# Patient Record
Sex: MALE | Race: WHITE | Employment: OTHER | URBAN - METROPOLITAN AREA
[De-identification: names, ages, dates, MRNs, and addresses within clinical notes are randomized per-mention and may not be internally consistent; named-entity substitution may affect disease eponyms.]

---

## 2018-06-13 ENCOUNTER — OFFICE VISIT (OUTPATIENT)
Dept: PRIMARY CARE CLINIC | Age: 73
End: 2018-06-13
Payer: MEDICARE

## 2018-06-13 ENCOUNTER — HOSPITAL ENCOUNTER (OUTPATIENT)
Age: 73
Discharge: HOME | End: 2018-06-15
Payer: MEDICARE

## 2018-06-13 VITALS
HEART RATE: 59 BPM | SYSTOLIC BLOOD PRESSURE: 122 MMHG | TEMPERATURE: 97.3 F | BODY MASS INDEX: 24.5 KG/M2 | WEIGHT: 175 LBS | OXYGEN SATURATION: 98 % | HEIGHT: 71 IN | DIASTOLIC BLOOD PRESSURE: 80 MMHG

## 2018-06-13 DIAGNOSIS — I25.10 CORONARY ARTERY DISEASE INVOLVING NATIVE HEART WITHOUT ANGINA PECTORIS, UNSPECIFIED VESSEL OR LESION TYPE: ICD-10-CM

## 2018-06-13 DIAGNOSIS — Z13.6 SCREENING FOR AAA (ABDOMINAL AORTIC ANEURYSM): ICD-10-CM

## 2018-06-13 DIAGNOSIS — Z12.5 SCREENING PSA (PROSTATE SPECIFIC ANTIGEN): ICD-10-CM

## 2018-06-13 DIAGNOSIS — Z00.00 ROUTINE GENERAL MEDICAL EXAMINATION AT A HEALTH CARE FACILITY: Primary | ICD-10-CM

## 2018-06-13 LAB
ALBUMIN SERPL-MCNC: 4.5 G/DL (ref 3.5–5.2)
ALP BLD-CCNC: 56 U/L (ref 40–129)
ALT SERPL-CCNC: 16 U/L (ref 0–40)
ANION GAP SERPL CALCULATED.3IONS-SCNC: 12 MMOL/L (ref 7–16)
AST SERPL-CCNC: 16 U/L (ref 0–39)
BILIRUB SERPL-MCNC: 0.5 MG/DL (ref 0–1.2)
BUN BLDV-MCNC: 10 MG/DL (ref 8–23)
CALCIUM SERPL-MCNC: 9.5 MG/DL (ref 8.6–10.2)
CHLORIDE BLD-SCNC: 105 MMOL/L (ref 98–107)
CHOLESTEROL, TOTAL: 156 MG/DL (ref 0–199)
CO2: 27 MMOL/L (ref 22–29)
CREAT SERPL-MCNC: 0.9 MG/DL (ref 0.7–1.2)
GFR AFRICAN AMERICAN: >60
GFR NON-AFRICAN AMERICAN: >60 ML/MIN/1.73
GLUCOSE BLD-MCNC: 93 MG/DL (ref 74–109)
HDLC SERPL-MCNC: 59 MG/DL
LDL CHOLESTEROL CALCULATED: 83 MG/DL (ref 0–99)
POTASSIUM SERPL-SCNC: 4.9 MMOL/L (ref 3.5–5)
PROSTATE SPECIFIC ANTIGEN: 1.9 NG/ML (ref 0–4)
SODIUM BLD-SCNC: 144 MMOL/L (ref 132–146)
TOTAL PROTEIN: 7.4 G/DL (ref 6.4–8.3)
TRIGL SERPL-MCNC: 68 MG/DL (ref 0–149)
VLDLC SERPL CALC-MCNC: 14 MG/DL

## 2018-06-13 PROCEDURE — G0439 PPPS, SUBSEQ VISIT: HCPCS | Performed by: FAMILY MEDICINE

## 2018-06-13 PROCEDURE — G0103 PSA SCREENING: HCPCS

## 2018-06-13 PROCEDURE — 80053 COMPREHEN METABOLIC PANEL: CPT

## 2018-06-13 PROCEDURE — 80061 LIPID PANEL: CPT

## 2018-06-13 ASSESSMENT — LIFESTYLE VARIABLES: HOW OFTEN DO YOU HAVE A DRINK CONTAINING ALCOHOL: 0

## 2018-06-13 ASSESSMENT — ENCOUNTER SYMPTOMS
SHORTNESS OF BREATH: 0
ABDOMINAL PAIN: 0
ANAL BLEEDING: 1
CONSTIPATION: 0
DIARRHEA: 0

## 2018-06-13 ASSESSMENT — PATIENT HEALTH QUESTIONNAIRE - PHQ9: SUM OF ALL RESPONSES TO PHQ QUESTIONS 1-9: 0

## 2018-06-13 ASSESSMENT — ANXIETY QUESTIONNAIRES: GAD7 TOTAL SCORE: 0

## 2018-06-15 ENCOUNTER — TELEPHONE (OUTPATIENT)
Dept: PRIMARY CARE CLINIC | Age: 73
End: 2018-06-15

## 2018-06-19 ENCOUNTER — OFFICE VISIT (OUTPATIENT)
Dept: CARDIOLOGY CLINIC | Age: 73
End: 2018-06-19
Payer: MEDICARE

## 2018-06-19 ENCOUNTER — HOSPITAL ENCOUNTER (OUTPATIENT)
Dept: ULTRASOUND IMAGING | Age: 73
Discharge: HOME | End: 2018-06-21
Payer: MEDICARE

## 2018-06-19 VITALS
HEART RATE: 55 BPM | SYSTOLIC BLOOD PRESSURE: 112 MMHG | BODY MASS INDEX: 24.86 KG/M2 | RESPIRATION RATE: 16 BRPM | HEIGHT: 71 IN | DIASTOLIC BLOOD PRESSURE: 62 MMHG | WEIGHT: 177.6 LBS

## 2018-06-19 DIAGNOSIS — I25.119 CORONARY ARTERY DISEASE INVOLVING NATIVE HEART WITH ANGINA PECTORIS, UNSPECIFIED VESSEL OR LESION TYPE (HCC): ICD-10-CM

## 2018-06-19 DIAGNOSIS — Z13.6 SCREENING FOR AAA (ABDOMINAL AORTIC ANEURYSM): ICD-10-CM

## 2018-06-19 DIAGNOSIS — R07.9 CHEST PAIN, UNSPECIFIED TYPE: ICD-10-CM

## 2018-06-19 DIAGNOSIS — Z95.1 S/P CABG X 4: ICD-10-CM

## 2018-06-19 DIAGNOSIS — I10 HTN (HYPERTENSION), BENIGN: ICD-10-CM

## 2018-06-19 DIAGNOSIS — I25.10 CORONARY ARTERY DISEASE INVOLVING NATIVE HEART WITHOUT ANGINA PECTORIS, UNSPECIFIED VESSEL OR LESION TYPE: Primary | ICD-10-CM

## 2018-06-19 DIAGNOSIS — E78.2 MIXED HYPERLIPIDEMIA: ICD-10-CM

## 2018-06-19 PROCEDURE — 76706 US ABDL AORTA SCREEN AAA: CPT

## 2018-06-19 PROCEDURE — 99213 OFFICE O/P EST LOW 20 MIN: CPT | Performed by: INTERNAL MEDICINE

## 2018-06-19 PROCEDURE — 93000 ELECTROCARDIOGRAM COMPLETE: CPT | Performed by: INTERNAL MEDICINE

## 2018-06-19 RX ORDER — SIMVASTATIN 40 MG
40 TABLET ORAL NIGHTLY
Qty: 90 TABLET | Refills: 4 | Status: SHIPPED | OUTPATIENT
Start: 2018-06-19 | End: 2019-06-24 | Stop reason: SDUPTHER

## 2018-06-21 ENCOUNTER — TELEPHONE (OUTPATIENT)
Dept: PRIMARY CARE CLINIC | Age: 73
End: 2018-06-21

## 2018-09-05 ENCOUNTER — HOSPITAL ENCOUNTER (OUTPATIENT)
Age: 73
Discharge: HOME | End: 2018-09-07

## 2018-09-05 PROCEDURE — 88305 TISSUE EXAM BY PATHOLOGIST: CPT

## 2018-09-10 ENCOUNTER — OFFICE VISIT (OUTPATIENT)
Dept: PRIMARY CARE CLINIC | Age: 73
End: 2018-09-10
Payer: MEDICARE

## 2018-09-10 VITALS
SYSTOLIC BLOOD PRESSURE: 122 MMHG | DIASTOLIC BLOOD PRESSURE: 78 MMHG | WEIGHT: 176 LBS | OXYGEN SATURATION: 98 % | BODY MASS INDEX: 24.55 KG/M2 | HEART RATE: 67 BPM | TEMPERATURE: 98.6 F

## 2018-09-10 DIAGNOSIS — K40.90 NON-RECURRENT UNILATERAL INGUINAL HERNIA WITHOUT OBSTRUCTION OR GANGRENE: ICD-10-CM

## 2018-09-10 DIAGNOSIS — Z23 NEED FOR INFLUENZA VACCINATION: Primary | ICD-10-CM

## 2018-09-10 PROCEDURE — G0008 ADMIN INFLUENZA VIRUS VAC: HCPCS | Performed by: FAMILY MEDICINE

## 2018-09-10 PROCEDURE — 90662 IIV NO PRSV INCREASED AG IM: CPT | Performed by: FAMILY MEDICINE

## 2018-09-10 PROCEDURE — 99213 OFFICE O/P EST LOW 20 MIN: CPT | Performed by: FAMILY MEDICINE

## 2018-09-10 ASSESSMENT — ENCOUNTER SYMPTOMS: ABDOMINAL PAIN: 0

## 2018-09-10 NOTE — PROGRESS NOTES
Ron Barraza, a male of 68 y.o. came to the office 9/10/2018. Patient Active Problem List   Diagnosis    Chest pain    HTN (hypertension), benign    NSTEMI (non-ST elevated myocardial infarction) ([facility]    TIA (transient ischemic attack)    Hypokalemia    CAD (coronary artery disease)    S/P CABG x 4          HPI hernia: right groin ache began 2 wks then noticed bulge 1 wk ago. Dull ache. History 1 on left 30 yrs ago. No Known Allergies    Current Outpatient Prescriptions on File Prior to Visit   Medication Sig Dispense Refill    simvastatin (ZOCOR) 40 MG tablet Take 1 tablet by mouth nightly 90 tablet 4    aspirin 81 MG chewable tablet Take 1 tablet by mouth daily. 30 tablet 3    fluticasone (FLONASE) 50 MCG/ACT nasal spray 1 spray by Nasal route daily. No current facility-administered medications on file prior to visit. Review of Systems   Gastrointestinal: Negative for abdominal pain. All other review of systems reviewed and are negative    OBJECTIVE:  /78   Pulse 67   Temp 98.6 °F (37 °C)   Wt 176 lb (79.8 kg)   SpO2 98%   BMI 24.55 kg/m²      Physical Exam   Abdominal: A hernia is present. Hernia confirmed positive in the right inguinal area. Hernia confirmed negative in the left inguinal area. Genitourinary:             ASSESSMENT AND PLAN:    Cristela Holt was seen today for hernia. Diagnoses and all orders for this visit:    Need for influenza vaccination  -     INFLUENZA, HIGH DOSE, 65 YRS +, IM, PF, PREFILL SYR, 0.5ML (FLUZONE HD)    Non-recurrent unilateral inguinal hernia without obstruction or gangrene  -     Amb External Referral To General Surgery        Return for as scheduled.     Nithin Tyler DO

## 2018-10-04 ENCOUNTER — TELEPHONE (OUTPATIENT)
Dept: PRIMARY CARE CLINIC | Age: 73
End: 2018-10-04

## 2019-03-07 ENCOUNTER — TELEPHONE (OUTPATIENT)
Dept: PRIMARY CARE CLINIC | Age: 74
End: 2019-03-07

## 2019-03-07 DIAGNOSIS — M54.31 BILATERAL SCIATICA: Primary | ICD-10-CM

## 2019-03-07 DIAGNOSIS — M54.32 BILATERAL SCIATICA: Primary | ICD-10-CM

## 2019-03-12 DIAGNOSIS — M54.31 BILATERAL SCIATICA: ICD-10-CM

## 2019-03-12 DIAGNOSIS — M54.32 BILATERAL SCIATICA: ICD-10-CM

## 2019-05-13 ENCOUNTER — TELEPHONE (OUTPATIENT)
Dept: PRIMARY CARE CLINIC | Age: 74
End: 2019-05-13

## 2019-05-13 NOTE — TELEPHONE ENCOUNTER
Patient called regarding his on going leg problem. States that he had been treated in [place], was given an injection for sciatica pain. Patient states that this did not help him and the pain and discomfort is worse. Patient scheduled an appointment for Tuesday, but would like to know if you could just call something in instead of him seeing you for an appointment. Please advise.

## 2019-05-14 ENCOUNTER — OFFICE VISIT (OUTPATIENT)
Dept: PRIMARY CARE CLINIC | Age: 74
End: 2019-05-14
Payer: MEDICARE

## 2019-05-14 VITALS
OXYGEN SATURATION: 96 % | BODY MASS INDEX: 24.92 KG/M2 | TEMPERATURE: 97.8 F | SYSTOLIC BLOOD PRESSURE: 134 MMHG | WEIGHT: 178 LBS | HEART RATE: 68 BPM | HEIGHT: 71 IN | DIASTOLIC BLOOD PRESSURE: 76 MMHG

## 2019-05-14 DIAGNOSIS — M48.07 SPINAL STENOSIS OF LUMBOSACRAL REGION: Primary | ICD-10-CM

## 2019-05-14 PROCEDURE — 99213 OFFICE O/P EST LOW 20 MIN: CPT | Performed by: FAMILY MEDICINE

## 2019-05-14 ASSESSMENT — PATIENT HEALTH QUESTIONNAIRE - PHQ9
1. LITTLE INTEREST OR PLEASURE IN DOING THINGS: 0
2. FEELING DOWN, DEPRESSED OR HOPELESS: 0
SUM OF ALL RESPONSES TO PHQ QUESTIONS 1-9: 0
1. LITTLE INTEREST OR PLEASURE IN DOING THINGS: 0
SUM OF ALL RESPONSES TO PHQ QUESTIONS 1-9: 0
2. FEELING DOWN, DEPRESSED OR HOPELESS: 0
SUM OF ALL RESPONSES TO PHQ9 QUESTIONS 1 & 2: 0
SUM OF ALL RESPONSES TO PHQ9 QUESTIONS 1 & 2: 0

## 2019-05-14 ASSESSMENT — ENCOUNTER SYMPTOMS: BACK PAIN: 1

## 2019-05-14 NOTE — PROGRESS NOTES
Grady Peterson, a male of 68 y.o. came to the office 5/14/2019. Patient Active Problem List   Diagnosis    Chest pain    HTN (hypertension), benign    [facility]    TIA (transient ischemic attack)    Hypokalemia    CAD (coronary artery disease)    S/P CABG x 4          Back Pain   This is a chronic problem. The current episode started more than 1 month ago. The problem is unchanged. The pain is present in the gluteal (top of buttocks. ). The quality of the pain is described as aching. Radiates to: calves. The symptoms are aggravated by standing (and walking ). Associated symptoms include leg pain, numbness, tingling and weakness. He has tried NSAIDs (injection giving into back by sports med dr in [place] that helped for 1 wk.  sitting helps and pain goes away. walking bent over also helps pain go away. ) for the symptoms. The treatment provided no relief. No Known Allergies    Current Outpatient Medications on File Prior to Visit   Medication Sig Dispense Refill    simvastatin (ZOCOR) 40 MG tablet Take 1 tablet by mouth nightly 90 tablet 4    aspirin 81 MG chewable tablet Take 1 tablet by mouth daily. 30 tablet 3    fluticasone (FLONASE) 50 MCG/ACT nasal spray 1 spray by Nasal route daily. No current facility-administered medications on file prior to visit. Review of Systems   Musculoskeletal: Positive for back pain. Neurological: Positive for tingling, weakness and numbness. All other review of systems reviewed and are negative    OBJECTIVE:  /76   Pulse 68   Temp 97.8 °F (36.6 °C)   Ht 5' 11\" (1.803 m)   Wt 178 lb (80.7 kg)   SpO2 96%   BMI 24.83 kg/m²      Physical Exam   Musculoskeletal:        Lumbar back: He exhibits normal range of motion, no tenderness, no pain and no spasm.    Negative standing flexion test  Negative straight leg raising seated  No paravertebral muscular hypertonicity  Muscular strength 5/5 in lower extremities  Neurological 2+/4 L4 & S1 reflexes       ASSESSMENT AND PLAN:    Naz Jo was seen today for leg pain and back pain. Diagnoses and all orders for this visit:    Spinal stenosis of lumbosacral region  -     MRI Lumbar Spine WO Contrast; Future    - may need epidural or referral to ortho spine dr.     Return in about 1 month (around 6/14/2019) for medicare pe.     Ana Tyler, DO

## 2019-05-23 ENCOUNTER — HOSPITAL ENCOUNTER (OUTPATIENT)
Dept: MRI IMAGING | Age: 74
Discharge: HOME | End: 2019-05-25
Payer: MEDICARE

## 2019-05-23 DIAGNOSIS — M48.07 SPINAL STENOSIS OF LUMBOSACRAL REGION: ICD-10-CM

## 2019-05-23 PROCEDURE — 72148 MRI LUMBAR SPINE W/O DYE: CPT

## 2019-05-28 ENCOUNTER — TELEPHONE (OUTPATIENT)
Dept: FAMILY MEDICINE CLINIC | Age: 74
End: 2019-05-28

## 2019-05-28 DIAGNOSIS — M51.36 DEGENERATIVE DISC DISEASE, LUMBAR: Primary | ICD-10-CM

## 2019-05-28 NOTE — TELEPHONE ENCOUNTER
Called patient on May 25 and discussed MRI report of his lumbar sacral spine. We will refer him for epidural injections. In the meantime Decadron taper called in.

## 2019-06-05 NOTE — PROGRESS NOTES
present. Thickening of the   ligamentum flavum and facet joint osteoarthropathy is seen. Moderate   to severe central spinal stenosis is identified. Borderline bilateral   neural foraminal narrowing is seen. At L4-5: A circumferential disc bulge is present with facet joint   osteoarthropathy and thickening of the ligamentum flavum. Moderate   left neural foraminal narrowing is seen. No central spinal stenosis or   right neural foraminal narrowing is identified. At L5-S1: No central stenosis or neural foraminal narrowing. Moderate   facet joint osteoarthropathy is present. Lumbar xray 2019 -       Previous treatments: Epidural Steroid Injection (in [place]) and chiropracter. Past Medical History:   Diagnosis Date    Colon polyp     Kidney stones     Sinusitis     TIA (transient ischemic attack)     Unspecified cerebral artery occlusion with cerebral infarction March 2013       Past Surgical History:   Procedure Laterality Date    COLONOSCOPY  08/04/2015    [facility]    CORONARY ARTERY BYPASS GRAFT  11/01/2013    x 4  Dr. Renetta Garcia?  [facility]       Prior to Admission medications    Medication Sig Start Date End Date Taking? Authorizing Provider   simvastatin (ZOCOR) 40 MG tablet Take 1 tablet by mouth nightly 6/19/18  Yes Jayant Simon MD   aspirin 81 MG chewable tablet Take 1 tablet by mouth daily. 11/7/13  Yes Marta Nunes DO   fluticasone (FLONASE) 50 MCG/ACT nasal spray 1 spray by Nasal route daily.    Yes Historical Provider, MD       No Known Allergies    Social History     Socioeconomic History    Marital status:      Spouse name: Not on file    Number of children: Not on file    Years of education: Not on file    Highest education level: Not on file   Occupational History    Not on file   Social Needs    Financial resource strain: Not on file    Food insecurity:     Worry: Not on file     Inability: Not on file    Transportation needs:     Medical: Not on file     Non-medical: Not on file   Tobacco Use    Smoking status: Former Smoker     Packs/day: 1.00     Years: 30.00     Pack years: 30.00     Last attempt to quit: 1966     Years since quittin.6    Smokeless tobacco: Never Used   Substance and Sexual Activity    Alcohol use: Yes     Alcohol/week: 3.6 oz     Types: 4 Glasses of wine, 2 Cans of beer per week     Comment: moderate    Drug use: No    Sexual activity: Not on file   Lifestyle    Physical activity:     Days per week: Not on file     Minutes per session: Not on file    Stress: Not on file   Relationships    Social connections:     Talks on phone: Not on file     Gets together: Not on file     Attends Congregational service: Not on file     Active member of club or organization: Not on file     Attends meetings of clubs or organizations: Not on file     Relationship status: Not on file    Intimate partner violence:     Fear of current or ex partner: Not on file     Emotionally abused: Not on file     Physically abused: Not on file     Forced sexual activity: Not on file   Other Topics Concern    Not on file   Social History Narrative    Not on file       History reviewed. No pertinent family history. REVIEW OF SYSTEMS:     Patient specifically denies fever/chills, chest pain, shortness of breath, new bowel or bladder complaints. All other review of systems was negative. PHYSICAL EXAMINATION:      /70   Pulse 84   Temp 98.4 °F (36.9 °C)   Resp 18   Ht 6' (1.829 m)   Wt 184 lb (83.5 kg)   SpO2 98%   BMI 24.95 kg/m²     General:      General appearance:pleasant and well-hydrated, in no distress and A & O x3  Build:Normal Weight  Function:Rises from a seated position easily.     HEENT:    Head:normocephalic, atraumatic  Pupils:regular, round, equal  Sclera: icterus absent    Lungs:    Breathing:normal breathing pattern    Abdomen:    Shape:non-distended and normal  Tenderness:none  Guarding:none    Lumbar spine:    Spine inspection:normal   CVA tenderness:No   Palpation:tenderness paravertebral muscles, left, right and positive. Range of motion:abnormal mildly in lateral bending, flexion, extension rotation bilateral and is not painful. Musculoskeletal:    Trigger points in Paraveteral:absent bilaterally  SI joint tenderness:negative right, negative left              CARLINE test:not done right, not done             left  Piriformis tenderness:negative right, negative left  Trochanteric bursa tenderness:negative right, negative left  SLR:negative right, negative left, sitting     Extremities:    Tremors:None bilaterally upper and lower  Range of motion:pain with internal rotation of hips negative.   Intact:Yes  Varicose veins:absent   Pulses:present Lt radial  Cyanosis:none  Edema:none x all 4 extremities    Neurological:    Sensory:normal to light touch BLE    Motor:                Right Quadriceps5/5          Left Quadriceps5/5           Right Gastrocnemius5/5    Left Gastrocnemius5/5  Right Ant Tibialis5/5  Left Ant Tibialis5/5    Reflexes:    Right Quadriceps reflex2+  Left Quadriceps reflex2+  Right Achilles reflex2+  Left Achilles reflex2+  Gait:normal    Dermatology:    Skin:no rashes or lesions noted    Assessment/Plan:  Low back pain (region of b/l SIJ) radiating posteriorly in the BLE to a level of the calves  2019 Lumbar MRI shows degenerative changes with L2-3 moderate central stenosis, L3-4 moderate to severe central stenosis and L4-5 moderate left foraminal stenosis  Had one YA in [place] which was helpful    Reviewed referral documents and imaging  Pt prefers to avoid medications at this time but can consider membrane stabilizers prn  [place]AR[place] report reviewed  L5-S1 YA - r/b discussed  Patient encouraged to stay active  Treatment plan discussed with the patient including medication and procedure side effects CC:  Referring physician    BALDOMERO Han.

## 2019-06-06 ENCOUNTER — TELEPHONE (OUTPATIENT)
Dept: PAIN MANAGEMENT | Age: 74
End: 2019-06-06

## 2019-06-06 ENCOUNTER — PREP FOR PROCEDURE (OUTPATIENT)
Dept: PAIN MANAGEMENT | Age: 74
End: 2019-06-06

## 2019-06-06 ENCOUNTER — OFFICE VISIT (OUTPATIENT)
Dept: PAIN MANAGEMENT | Age: 74
End: 2019-06-06
Payer: MEDICARE

## 2019-06-06 VITALS
DIASTOLIC BLOOD PRESSURE: 70 MMHG | OXYGEN SATURATION: 98 % | RESPIRATION RATE: 18 BRPM | BODY MASS INDEX: 24.92 KG/M2 | WEIGHT: 184 LBS | HEART RATE: 84 BPM | TEMPERATURE: 98.4 F | SYSTOLIC BLOOD PRESSURE: 124 MMHG | HEIGHT: 72 IN

## 2019-06-06 DIAGNOSIS — G89.29 CHRONIC BILATERAL LOW BACK PAIN WITH BILATERAL SCIATICA: ICD-10-CM

## 2019-06-06 DIAGNOSIS — G89.4 CHRONIC PAIN SYNDROME: Primary | ICD-10-CM

## 2019-06-06 DIAGNOSIS — M54.16 LUMBAR RADICULOPATHY: Primary | ICD-10-CM

## 2019-06-06 DIAGNOSIS — M54.42 CHRONIC BILATERAL LOW BACK PAIN WITH BILATERAL SCIATICA: ICD-10-CM

## 2019-06-06 DIAGNOSIS — M54.16 LUMBAR RADICULOPATHY: ICD-10-CM

## 2019-06-06 DIAGNOSIS — M54.41 CHRONIC BILATERAL LOW BACK PAIN WITH BILATERAL SCIATICA: ICD-10-CM

## 2019-06-06 DIAGNOSIS — M48.062 SPINAL STENOSIS OF LUMBAR REGION WITH NEUROGENIC CLAUDICATION: ICD-10-CM

## 2019-06-06 PROCEDURE — 99204 OFFICE O/P NEW MOD 45 MIN: CPT | Performed by: PAIN MEDICINE

## 2019-06-11 NOTE — PROGRESS NOTES
[facility]  INSTRUCTIONS    . ..........................................................................................................................................       ? Parking the day of Surgery is located in the [facility]. Upon entering the door, make an immediate right to the surgery reception desk    ? Bring photo ID and insurance card     ? You may have a light breakfast day of procedure    ? Wear loose comfortable clothing    ? Please follow instructions for medications as given per Dr's office     ? Stop blood thinners as per Dr's office instructions    ? You can expect a call the business day prior to procedure to notify you if your arrival time changes    ? Please arrange for     ?   Other instructions

## 2019-06-13 ENCOUNTER — HOSPITAL ENCOUNTER (OUTPATIENT)
Age: 74
Setting detail: OUTPATIENT SURGERY
Discharge: HOME | End: 2019-06-13
Attending: PAIN MEDICINE | Admitting: PAIN MEDICINE
Payer: MEDICARE

## 2019-06-13 ENCOUNTER — HOSPITAL ENCOUNTER (OUTPATIENT)
Dept: GENERAL RADIOLOGY | Age: 74
Discharge: HOME | End: 2019-06-15
Attending: PAIN MEDICINE
Payer: MEDICARE

## 2019-06-13 VITALS
TEMPERATURE: 98 F | RESPIRATION RATE: 20 BRPM | BODY MASS INDEX: 24.92 KG/M2 | HEIGHT: 72 IN | DIASTOLIC BLOOD PRESSURE: 66 MMHG | HEART RATE: 61 BPM | WEIGHT: 184 LBS | SYSTOLIC BLOOD PRESSURE: 129 MMHG | OXYGEN SATURATION: 96 %

## 2019-06-13 DIAGNOSIS — R52 PAIN: ICD-10-CM

## 2019-06-13 PROCEDURE — 2500000003 HC RX 250 WO HCPCS: Performed by: PAIN MEDICINE

## 2019-06-13 PROCEDURE — 2709999900 HC NON-CHARGEABLE SUPPLY: Performed by: PAIN MEDICINE

## 2019-06-13 PROCEDURE — 7100000010 HC PHASE II RECOVERY - FIRST 15 MIN: Performed by: PAIN MEDICINE

## 2019-06-13 PROCEDURE — 7100000011 HC PHASE II RECOVERY - ADDTL 15 MIN: Performed by: PAIN MEDICINE

## 2019-06-13 PROCEDURE — 3600000002 HC SURGERY LEVEL 2 BASE: Performed by: PAIN MEDICINE

## 2019-06-13 PROCEDURE — 3209999900 FLUORO FOR SURGICAL PROCEDURES

## 2019-06-13 PROCEDURE — 6360000002 HC RX W HCPCS: Performed by: PAIN MEDICINE

## 2019-06-13 PROCEDURE — 6360000004 HC RX CONTRAST MEDICATION: Performed by: PAIN MEDICINE

## 2019-06-13 PROCEDURE — 62323 NJX INTERLAMINAR LMBR/SAC: CPT | Performed by: PAIN MEDICINE

## 2019-06-13 RX ORDER — METHYLPREDNISOLONE ACETATE 40 MG/ML
INJECTION, SUSPENSION INTRA-ARTICULAR; INTRALESIONAL; INTRAMUSCULAR; SOFT TISSUE PRN
Status: DISCONTINUED | OUTPATIENT
Start: 2019-06-13 | End: 2019-06-13 | Stop reason: ALTCHOICE

## 2019-06-13 RX ORDER — LIDOCAINE HYDROCHLORIDE 5 MG/ML
INJECTION, SOLUTION INFILTRATION; INTRAVENOUS PRN
Status: DISCONTINUED | OUTPATIENT
Start: 2019-06-13 | End: 2019-06-13 | Stop reason: ALTCHOICE

## 2019-06-13 ASSESSMENT — PAIN - FUNCTIONAL ASSESSMENT: PAIN_FUNCTIONAL_ASSESSMENT: 0-10

## 2019-06-13 ASSESSMENT — PAIN SCALES - GENERAL
PAINLEVEL_OUTOF10: 0

## 2019-06-13 NOTE — OP NOTE
2019    Patient: Tami Vizcarra  :  1945  Age:  68 y.o. Sex:  male     PRE-OPERATIVE DIAGNOSIS: Lumbar disc displacement, lumbar radiculopathy. POST-OPERATIVE DIAGNOSIS: Same. PROCEDURE: Fluoroscopic guided therapeutic lumbar epidural steroid injection at the L5-S1 level (# 1). SURGEON: BALDOMERO Bernabe. ANESTHESIA: local    ESTIMATED BLOOD LOSS: None.  __________________________________________________    BRIEF HISTORY:  Tami Vizcarra comes in today for first lumbar epidural injection at L5-S1 level. The potential complications of this procedure were discussed with him again today. He has elected to undergo the aforementioned procedure. Cristino complete History & Physical examination were reviewed in depth, a copy of which is in the chart. DESCRIPTION OF PROCEDURE:    After confirming written and informed consent, a time-out was performed and Cristino name and date of birth, the procedure to be performed as well as the plan for the location of the needle insertion were confirmed. The patient was brought into the procedure room and placed in the prone position on the fluoroscopy table. A pillow was placed under the patient's lower abdomen/upper pelvis to increase lumbar interlaminar space. Standard monitors were placed, and vital signs were observed throughout the procedure. The area of the lumbar spine was prepped with chloraprep and draped in a sterile manner. The L5-S1 interspace was identified and marked under AP fluoroscopy. The skin and subcutaneous tissues at the above level were anesthestized with 0.5% lidocaine. With intermittent fluoroscopy, an # 18 gauge 3 1/2 tuohy epidural needle was inserted and directed toward the interlaminar space. The needle was slowly advanced using loss of resistance technique and 5 cc glass syringe until the tip of the epidural needle has passed through the ligamentum flavum and entered the epidural space.  AP and lateral fluoroscopic imaging was performed to verify that the epidural needle was properly placed. Negative aspiration of blood and CSF was confirmed. Two ml of Isovue-M 200 was used for confirmation of even epidural spread under both live lateral and live AP fluoroscopy. After negative aspiration, a solution of 0.5 % Lidocaine 3 ml and 40 mg DepoMedrol was easily injected. The needle was gently removed intact . The patient's back was cleaned and a Band-Aid was placed over the needle insertion point. Disposition the patient tolerated the procedure well and there were no complications . Vital signs remained stable throughout the procedure. The patient was escorted to the recovery area where they remained until discharge and written discharge instructions for the procedure were given. Plan: Olga Long will return to our pain management center as scheduled.      Tab Ni DO

## 2019-06-13 NOTE — H&P
[facility]        [address] [facility], [address] [place] [address]  Dept: [phone]    Procedure History & Physical      Cleola Maker     HPI:    Patient  is here for LBP BLE pain for L5-S1 YA  Labs/imaging studies reviewed   All question and concerns addressed including R/B/A associated with the procedure    Past Medical History:   Diagnosis Date    CAD (coronary artery disease)     Dr. Bam Coelho Chronic back pain     Colon polyp     Kidney stones     Sciatica     Sinusitis     TIA (transient ischemic attack)     Unspecified cerebral artery occlusion with cerebral infarction March 2013    TIA- no deficits        Past Surgical History:   Procedure Laterality Date    COLONOSCOPY  08/04/2015    COLONOSCOPY  09/05/2018    [facility]    CORONARY ARTERY BYPASS GRAFT  11/01/2013    x 4  Dr. Malia Macedo, COLON, DIAGNOSTIC     [address] [place] [address]?  INGUINAL HERNIA REPAIR Right 11/09/2018    [facility]    TONSILLECTOMY         Prior to Admission medications    Medication Sig Start Date End Date Taking? Authorizing Provider   simvastatin (ZOCOR) 40 MG tablet Take 1 tablet by mouth nightly 6/19/18  Yes Reyna Joshi MD   fluticasone (FLONASE) 50 MCG/ACT nasal spray 1 spray by Nasal route daily. Yes Historical Provider, MD   aspirin 81 MG chewable tablet Take 1 tablet by mouth daily.   Patient taking differently: Take 81 mg by mouth daily LD 6-6-19 11/7/13   Clement Mode, DO       No Known Allergies    Social History     Socioeconomic History    Marital status:      Spouse name: Not on file    Number of children: Not on file    Years of education: Not on file    Highest education level: Not on file   Occupational History    Not on file   Social Needs    Financial resource strain: Not on file    Food insecurity:     Worry: Not on file     Inability: Not on file    Transportation needs:     Medical: Not on file     Non-medical: Not on file   Tobacco Use    Smoking status: Former Smoker     Packs/day: 1.00     Years: 30.00     Pack years: 30.00     Last attempt to quit: 1966     Years since quittin.6    Smokeless tobacco: Never Used   Substance and Sexual Activity    Alcohol use: Not Currently     Alcohol/week: 0.0 oz    Drug use: No    Sexual activity: Not on file   Lifestyle    Physical activity:     Days per week: Not on file     Minutes per session: Not on file    Stress: Not on file   Relationships    Social connections:     Talks on phone: Not on file     Gets together: Not on file     Attends Judaism service: Not on file     Active member of club or organization: Not on file     Attends meetings of clubs or organizations: Not on file     Relationship status: Not on file    Intimate partner violence:     Fear of current or ex partner: Not on file     Emotionally abused: Not on file     Physically abused: Not on file     Forced sexual activity: Not on file   Other Topics Concern    Not on file   Social History Narrative    Not on file       History reviewed. No pertinent family history. REVIEW OF SYSTEMS:    CONSTITUTIONAL:  negative for  fevers, chills, sweats and fatigue    RESPIRATORY:  negative for  dry cough, cough with sputum, dyspnea, wheezing and chest pain    CARDIOVASCULAR:  negative for chest pain, dyspnea, palpitations, syncope    GASTROINTESTINAL:  negative for nausea, vomiting, change in bowel habits, diarrhea, constipation and abdominal pain    MUSCULOSKELETAL: negative for muscle weakness    SKIN: negative for itching or rashes.     BEHAVIOR/PSYCH:  negative for poor appetite, increased appetite, decreased sleep and poor concentration    All other systems negative      PHYSICAL EXAM:    VITALS:  BP (!) 153/76   Pulse 63   Temp 98 °F (36.7 °C) (Temporal)   Resp 16   Ht 6' (1.829 m)   Wt 184 lb (83.5 kg)   SpO2 97%   BMI 24.95 kg/m²     CONSTITUTIONAL:  awake, alert, cooperative, no apparent distress, and appears stated age    EYES: PERRLA, EOMI    LUNGS:  No increased work of breathing, no audible wheezing    CARDIOVASCULAR:  regular rate and rhythm    ABDOMEN:  Soft non tender non distended     EXTREMITIES: no signs of clubbing or cyanosis. MUSCULOSKELETAL: negative for flaccid muscle tone or spastic movements. SKIN: gross examination reveals no signs of rashes, or diaphoresis. NEURO: Cranial nerves II-XII grossly intact. No signs of agitated mood.        Assessment/Plan:    LBP and BLE pain for L5-S1 YA

## 2019-06-24 ENCOUNTER — OFFICE VISIT (OUTPATIENT)
Dept: CARDIOLOGY CLINIC | Age: 74
End: 2019-06-24
Payer: MEDICARE

## 2019-06-24 VITALS
DIASTOLIC BLOOD PRESSURE: 74 MMHG | HEART RATE: 71 BPM | WEIGHT: 178 LBS | RESPIRATION RATE: 16 BRPM | SYSTOLIC BLOOD PRESSURE: 122 MMHG | BODY MASS INDEX: 24.11 KG/M2 | HEIGHT: 72 IN

## 2019-06-24 DIAGNOSIS — I10 HTN (HYPERTENSION), BENIGN: ICD-10-CM

## 2019-06-24 DIAGNOSIS — R07.9 CHEST PAIN, UNSPECIFIED TYPE: ICD-10-CM

## 2019-06-24 DIAGNOSIS — I25.10 CORONARY ARTERY DISEASE INVOLVING NATIVE HEART WITHOUT ANGINA PECTORIS, UNSPECIFIED VESSEL OR LESION TYPE: Primary | ICD-10-CM

## 2019-06-24 DIAGNOSIS — E78.2 MIXED HYPERLIPIDEMIA: ICD-10-CM

## 2019-06-24 DIAGNOSIS — I25.119 CORONARY ARTERY DISEASE INVOLVING NATIVE HEART WITH ANGINA PECTORIS, UNSPECIFIED VESSEL OR LESION TYPE (HCC): ICD-10-CM

## 2019-06-24 DIAGNOSIS — Z95.1 S/P CABG X 4: ICD-10-CM

## 2019-06-24 PROCEDURE — 99213 OFFICE O/P EST LOW 20 MIN: CPT | Performed by: INTERNAL MEDICINE

## 2019-06-24 PROCEDURE — 93000 ELECTROCARDIOGRAM COMPLETE: CPT | Performed by: INTERNAL MEDICINE

## 2019-06-24 RX ORDER — SIMVASTATIN 40 MG
40 TABLET ORAL NIGHTLY
Qty: 90 TABLET | Refills: 4 | Status: SHIPPED | OUTPATIENT
Start: 2019-06-24 | End: 2019-09-17 | Stop reason: SDUPTHER

## 2019-06-24 NOTE — PROGRESS NOTES
OFFICE FOLLOW-UP    Name: Bushra Thomas  Age: 68 y.o. Primary Care Physician: David Blackburn DO    Date of Service: 6/24/2019    Chief Complaint: Follow-up for CAD s/p CABG on 11/4/13    Interim History:   No new cardiac complaints since hospital discharge or last office visit. No complaints of angina, dyspnea on exertion, palpitations, dizziness, lightheadedness, or pre-syncopal episodes (he still walks/rides bike 2 miles/day, golfs, etc with no cardiac complaints). Review of Systems:   Cardiac: As per HPI  General: No fever, chills  Pulmonary: As per HPI  HEENT: No visual disturbances, difficult swallowing  GI: No nausea, vomiting, abdominal pain  Endocrine: No thyroid disease or diabetes  Musculoskeletal: RIBEIRO x 4, no focal motor deficits  Skin: Intact, no rashes  Neuro/Psych: No headache or seizures    Past Medical History:  Past Medical History:   Diagnosis Date    CAD (coronary artery disease)     Dr. Ehsan Chu     Chronic back pain     Colon polyp     Kidney stones     Sciatica     Sinusitis     TIA (transient ischemic attack)     Unspecified cerebral artery occlusion with cerebral infarction March 2013    TIA- no deficits      Past Surgical History:  Past Surgical History:   Procedure Laterality Date    COLONOSCOPY  08/04/2015    COLON[facility]COPY  09/05/2018    [facility]    CORONARY ARTERY BYPASS GRAFT  11/01/2013    x 4  Dr. Licha Ramachandran ENDOSCOPY, COLON, DIAGNOSTIC      EPIDURAL STEROID INJECTION N/A 6/13/2019    L5-S1 EPIDURAL STEROID INJECTION performed by Dorene Montgomery DO at [facility]?     INGUINAL HERNIA REPAIR Right 11/09/2018    [facility]    TONSILLECTOMY       Social History:  Social History     Socioeconomic History    Marital status:      Spouse name: Not on file    Number of children: Not on file    Years of education: Not on file    Highest education level: Not on file   Occupational History    Not on file   Social Needs    Financial kg)     Appearance: Awake, alert, no acute respiratory distress  Skin: Intact, no rash  Head: Normocephalic, atraumatic  Eyes: EOMI, no conjunctival erythema  ENMT: No pharyngeal erythema, MMM, no rhinorrhea  Neck: Supple, no elevated JVP, no carotid bruits  Lungs: Clear to auscultation bilaterally. No wheezes, rales, or rhonchi. Cardiac: Regular rate and rhythm, +S1S2, no murmurs apparent  Abdomen: Soft, nontender, +bowel sounds  Extremities: Moves all extremities x 4, no lower extremity edema  Neurologic: No focal motor deficits apparent, normal mood and affect  Peripheral Pulses: Intact posterior tibial pulses bilaterally    Laboratory Tests:  Lab Results   Component Value Date    CREATININE 0.9 06/13/2018    BUN 10 06/13/2018     06/13/2018    K 4.9 06/13/2018     06/13/2018    CO2 27 06/13/2018     Lab Results   Component Value Date    WBC 12.9 11/06/2013    RBC 3.29 11/06/2013    HGB 10.5 11/06/2013    HCT 30.0 11/06/2013    MCV 91.2 11/06/2013    MCH 31.8 11/06/2013    MCHC 34.9 11/06/2013    RDW 13.9 11/06/2013     11/06/2013    MPV 7.7 11/06/2013     Lab Results   Component Value Date    MG 2.3 11/05/2013     Lab Results   Component Value Date    PROTIME 14.3 11/04/2013    INR 1.5 11/04/2013     Lab Results   Component Value Date    TSH 1.115 11/03/2013     Lab Results   Component Value Date    TRIG 68 06/13/2018    TRIG 77 06/21/2017    TRIG 156 (H) 06/28/2016     Lab Results   Component Value Date    HDL 59 06/13/2018    HDL 53 06/21/2017    HDL 67 06/28/2016     Lab Results   Component Value Date    LDLCALC 83 06/13/2018    LDLCALC 72 06/21/2017    LDLCALC 102 (H) 06/28/2016     Cardiac Tests:  ECG: SR, rate 71, NSSTT changes    ASSESSMENT / PLAN:  1. CAD/NSTEMI s/p CABG on 11/4/13 (LIMA-LAD, SVG-D1, SVG-OM1, and SVG-PDA) -- currently with no active cardiac complaints  2. Prior TIA  3. Normal LV systolic function with no wall motion abnormalities on LV gram (EF 60%)  4.  History of

## 2019-07-02 NOTE — PROGRESS NOTES
[facility] - BEHAVIORAL HEALTH SERVICES Pain Management        [address], [address]  Dept: [phone]        Follow up Note      Saroj Ferris     Date of Visit:  07/03/19     CC:  Patient presents for follow up   Chief Complaint   Patient presents with    Pain     lower back and down the legs      HPI:    Pain is unchanged. Change in quality of symptoms:no. Medication side effects:not applicable . Recent diagnostic testing:none. Recent interventional procedures:L5-S1 YA #1 with 50% relief x 2 days. He has been on anticoagulation medications to include ASA and has not been on herbal supplements. He is not diabetic.     Imaging:   Lumbar MRI 2019 -   The spinal cord is normal in caliber and signal.  The lumbar spine   demonstrates normal anatomic lordosis. Mild levoconvex scoliosis of   the lumbar spine is seen. Multiple lateral vertebral body osteophytes   are seen, right greater than left. Bridging osteophytes are seen at   L1-L2 and L2-L3. The intervertebral discs are desiccated throughout   save the L5-S1 intravertebral disc. Loss of intervertebral disc height   is seen throughout the visualized spine save L5-S1. The bone marrow is   normal in signal. The visualized soft tissues are unremarkable.       At T12-L1: A right paracentral posterior disc bulge is present causing   minimal displacement of the right L1 nerve root. No neural foraminal   narrowing is seen. No central spinal stenosis is identified. At L1-2: No central stenosis or neural foraminal narrowing. At L2-3: A circumferential disc bulge is present with facet joint   arthropathy and thickening of the ligamentum flavum. Moderate central   spinal stenosis is identified. No neural foraminal narrowing is seen. At L3-4: A circumferential disc bulge is present. Thickening of the   ligamentum flavum and facet joint osteoarthropathy is seen. Moderate   to severe central spinal stenosis is identified.  Borderline bilateral   neural foraminal narrowing is seen. At L4-5: A circumferential disc bulge is present with facet joint   osteoarthropathy and thickening of the ligamentum flavum. Moderate   left neural foraminal narrowing is seen. No central spinal stenosis or   right neural foraminal narrowing is identified. At L5-S1: No central stenosis or neural foraminal narrowing. Moderate   facet joint osteoarthropathy is present.      Lumbar xray 2019 -          Potential Aberrant Drug-Related Behavior: no      Urine Drug Screening:    OARRS report:  6/2019 consistent    Past Medical History:   Diagnosis Date    CAD (coronary artery disease)     Dr. Isaak Meyer Chronic back pain     Colon polyp     Kidney stones     Sciatica     Sinusitis     TIA (transient ischemic attack)     Unspecified cerebral artery occlusion with cerebral infarction March 2013    TIA- no deficits        Past Surgical History:   Procedure Laterality Date    COLONOSCOPY  08/04/2015    COLONOSCOPY  09/05/2018    Milosevic    CORONARY ARTERY BYPASS GRAFT  11/01/2013    x 4  Dr. Media Bamberger ENDOSCOPY, COLON, DIAGNOSTIC      EPIDURAL STEROID INJECTION N/A 6/13/2019    L5-S1 EPIDURAL STEROID INJECTION performed [facility] Laura Workman [facility] at [facility]?  INGUINAL HERNIA REPAIR Right 11/09/2018    Milosevic    TONSILLECTOMY         Prior to Admission medications    Medication Sig Start Date End Date Taking? Authorizing Provider   simvastatin (ZOCOR) 40 MG tablet Take 1 tablet by mouth nightly 6/24/19  Yes Blair Ledesma MD   aspirin 81 MG chewable tablet Take 1 tablet by mouth daily. Patient taking differently: Take 81 mg by mouth daily LD 6-6-19 11/7/13  Yes Sonali Plummer, DO   fluticasone [facility]) 50 MCG/ACT nasal spray 1 spray by Nasal route daily.    Yes Historical Provider, MD       No Known Allergies    Social History     Socioeconomic History    Marital status:      Spouse name: Not on file    Number of children: Not on file    Years of education: Not on file    Highest education level: Not on file   Occupational History    Not on file   Social Needs    Financial resource strain: Not on file    Food insecurity:     Worry: Not on file     Inability: Not on file    Transportation needs:     Medical: Not on file     Non-medical: Not on file   Tobacco Use    Smoking status: Former Smoker     Packs/day: 1.00     Years: 30.00     Pack years: 30.00     Last attempt to quit: 1966     Years since quittin.7    Smokeless tobacco: Never Used   Substance and Sexual Activity    Alcohol use: Not Currently     Alcohol/week: 0.0 oz    Drug use: No    Sexual activity: Not on file   Lifestyle    Physical activity:     Days per week: Not on file     Minutes per session: Not on file    Stress: Not on file   Relationships    Social connections:     Talks on phone: Not on file     Gets together: Not on file     Attends Advent service: Not on file     Active member of club or organization: Not on file     Attends meetings of clubs or organizations: Not on file     Relationship status: Not on file    Intimate partner violence:     Fear of current or ex partner: Not on file     Emotionally abused: Not on file     Physically abused: Not on file     Forced sexual activity: Not on file   Other Topics Concern    Not on file   Social History Narrative    Not on file       History reviewed. No pertinent family history. REVIEW OF SYSTEMS:     Jenny Fritz denies fever/chills, chest pain, shortness of breath, new bowel or bladder complaints. All other review of systems was negative.     PHYSICAL EXAMINATION:      /76   Pulse 84   Resp 18   Ht 6' (1.829 m)   Wt 180 lb (81.6 kg)   SpO2 98%   BMI 24.41 kg/m²     General:       General appearance:pleasant and well-hydrated, in no distress and A & O x3  Build:Normal Weight  Function:Rises from a seated position easily.     HEENT:     Head:normocephalic, atraumatic  Pupils:regular, round,

## 2019-07-03 ENCOUNTER — OFFICE VISIT (OUTPATIENT)
Dept: PAIN MANAGEMENT | Age: 74
End: 2019-07-03
Payer: MEDICARE

## 2019-07-03 ENCOUNTER — PREP FOR PROCEDURE (OUTPATIENT)
Dept: PAIN MANAGEMENT | Age: 74
End: 2019-07-03

## 2019-07-03 VITALS
HEIGHT: 72 IN | DIASTOLIC BLOOD PRESSURE: 76 MMHG | OXYGEN SATURATION: 98 % | RESPIRATION RATE: 18 BRPM | SYSTOLIC BLOOD PRESSURE: 130 MMHG | HEART RATE: 84 BPM | BODY MASS INDEX: 24.38 KG/M2 | WEIGHT: 180 LBS

## 2019-07-03 DIAGNOSIS — M54.42 CHRONIC BILATERAL LOW BACK PAIN WITH BILATERAL SCIATICA: ICD-10-CM

## 2019-07-03 DIAGNOSIS — M54.16 LUMBAR RADICULOPATHY: Primary | ICD-10-CM

## 2019-07-03 DIAGNOSIS — M48.062 SPINAL STENOSIS OF LUMBAR REGION WITH NEUROGENIC CLAUDICATION: ICD-10-CM

## 2019-07-03 DIAGNOSIS — M54.41 CHRONIC BILATERAL LOW BACK PAIN WITH BILATERAL SCIATICA: ICD-10-CM

## 2019-07-03 DIAGNOSIS — G89.4 CHRONIC PAIN SYNDROME: Primary | ICD-10-CM

## 2019-07-03 DIAGNOSIS — M54.16 LUMBAR RADICULOPATHY: ICD-10-CM

## 2019-07-03 DIAGNOSIS — G89.29 CHRONIC BILATERAL LOW BACK PAIN WITH BILATERAL SCIATICA: ICD-10-CM

## 2019-07-03 PROCEDURE — 99213 OFFICE O/P EST LOW 20 MIN: CPT | Performed by: PAIN MEDICINE

## 2019-07-03 RX ORDER — GABAPENTIN 100 MG/1
CAPSULE ORAL
Qty: 69 CAPSULE | Refills: 0 | Status: SHIPPED | OUTPATIENT
Start: 2019-07-03 | End: 2019-09-06

## 2019-07-03 NOTE — PROGRESS NOTES
Salomon Wagner presents to the [facility] on 7/3/2019. Bethany Schultz is complaining of pain hips and down the legs to the ankle when he sits it is fine standing he has a lot of pain . The pain is intermittent. The pain is described as aching. Pain is rated on his best day at a 0, on his worst day at a 8, and on average at a 0 on the VAS scale when he is sitting  . He took his last dose of Motrin when is doing something  Golfing    Takes the pain away   . Any procedures since your last visit: Yes, with 40 % relief. After he left the hosptial he had pain then andthen the next day was 50% better the starting do his exercise and he has the pain again    Pacemaker or defibrilator: No managed by none. He has been on anticoagulation medications to include ASA and is managed by . Kenny Tyler DO    .      /76   Pulse 84   Resp 18   Ht 6' (1.829 m)   Wt 180 lb (81.6 kg)   SpO2 98%   BMI 24.41 kg/m²      No LMP for male patient.

## 2019-07-18 ENCOUNTER — HOSPITAL ENCOUNTER (OUTPATIENT)
Age: 74
Discharge: HOME | End: 2019-07-20
Payer: MEDICARE

## 2019-07-18 ENCOUNTER — OFFICE VISIT (OUTPATIENT)
Dept: PRIMARY CARE CLINIC | Age: 74
End: 2019-07-18
Payer: MEDICARE

## 2019-07-18 VITALS
HEART RATE: 55 BPM | HEIGHT: 72 IN | TEMPERATURE: 97.6 F | OXYGEN SATURATION: 97 % | BODY MASS INDEX: 24.79 KG/M2 | DIASTOLIC BLOOD PRESSURE: 74 MMHG | WEIGHT: 183 LBS | SYSTOLIC BLOOD PRESSURE: 144 MMHG

## 2019-07-18 DIAGNOSIS — Z12.5 SCREENING PSA (PROSTATE SPECIFIC ANTIGEN): ICD-10-CM

## 2019-07-18 DIAGNOSIS — I25.10 CORONARY ARTERY DISEASE INVOLVING NATIVE HEART WITHOUT ANGINA PECTORIS, UNSPECIFIED VESSEL OR LESION TYPE: Primary | ICD-10-CM

## 2019-07-18 DIAGNOSIS — I25.10 CORONARY ARTERY DISEASE INVOLVING NATIVE HEART WITHOUT ANGINA PECTORIS, UNSPECIFIED VESSEL OR LESION TYPE: ICD-10-CM

## 2019-07-18 DIAGNOSIS — M48.07 SPINAL STENOSIS OF LUMBOSACRAL REGION: ICD-10-CM

## 2019-07-18 LAB
ALBUMIN SERPL-MCNC: 4.7 G/DL (ref 3.5–5.2)
ALP BLD-CCNC: 67 U/L (ref 40–129)
ALT SERPL-CCNC: 24 U/L (ref 0–40)
ANION GAP SERPL CALCULATED.3IONS-SCNC: 14 MMOL/L (ref 7–16)
AST SERPL-CCNC: 25 U/L (ref 0–39)
BILIRUB SERPL-MCNC: 0.5 MG/DL (ref 0–1.2)
BUN BLDV-MCNC: 11 MG/DL (ref 8–23)
CALCIUM SERPL-MCNC: 9.6 MG/DL (ref 8.6–10.2)
CHLORIDE BLD-SCNC: 105 MMOL/L (ref 98–107)
CHOLESTEROL, TOTAL: 176 MG/DL (ref 0–199)
CO2: 26 MMOL/L (ref 22–29)
CREAT SERPL-MCNC: 0.9 MG/DL (ref 0.7–1.2)
GFR AFRICAN AMERICAN: >60
GFR NON-AFRICAN AMERICAN: >60 ML/MIN/1.73
GLUCOSE BLD-MCNC: 104 MG/DL (ref 74–99)
HDLC SERPL-MCNC: 70 MG/DL
LDL CHOLESTEROL CALCULATED: 87 MG/DL (ref 0–99)
POTASSIUM SERPL-SCNC: 4.5 MMOL/L (ref 3.5–5)
PROSTATE SPECIFIC ANTIGEN: 2.41 NG/ML (ref 0–4)
SODIUM BLD-SCNC: 145 MMOL/L (ref 132–146)
TOTAL PROTEIN: 7.6 G/DL (ref 6.4–8.3)
TRIGL SERPL-MCNC: 96 MG/DL (ref 0–149)
VLDLC SERPL CALC-MCNC: 19 MG/DL

## 2019-07-18 PROCEDURE — 80053 COMPREHEN METABOLIC PANEL: CPT

## 2019-07-18 PROCEDURE — 99213 OFFICE O/P EST LOW 20 MIN: CPT | Performed by: FAMILY MEDICINE

## 2019-07-18 PROCEDURE — G0103 PSA SCREENING: HCPCS

## 2019-07-18 PROCEDURE — 80061 LIPID PANEL: CPT

## 2019-07-18 ASSESSMENT — ENCOUNTER SYMPTOMS
SHORTNESS OF BREATH: 0
CHEST TIGHTNESS: 0

## 2019-07-22 ENCOUNTER — TELEPHONE (OUTPATIENT)
Dept: PRIMARY CARE CLINIC | Age: 74
End: 2019-07-22

## 2019-07-22 DIAGNOSIS — I25.10 CORONARY ARTERY DISEASE INVOLVING NATIVE HEART WITHOUT ANGINA PECTORIS, UNSPECIFIED VESSEL OR LESION TYPE: Primary | ICD-10-CM

## 2019-07-26 ENCOUNTER — TELEPHONE (OUTPATIENT)
Dept: PAIN MANAGEMENT | Age: 74
End: 2019-07-26

## 2019-07-26 ENCOUNTER — TELEPHONE (OUTPATIENT)
Dept: CARDIOLOGY CLINIC | Age: 74
End: 2019-07-26

## 2019-07-26 NOTE — TELEPHONE ENCOUNTER
Patient needs cardiac clearance for back surgery with Dr. Bonnie Baeza (CC) on 8/16/19. Spoke with patient. Patient denies any chest pain, shortness of breath or palpitations since last visit in June 2019. Able to complete all activities of daily living, including climbing stairs and carrying objects without cardiac symptoms. Please advise.      Fax:  [phone]

## 2019-09-03 ENCOUNTER — TELEPHONE (OUTPATIENT)
Dept: PRIMARY CARE CLINIC | Age: 74
End: 2019-09-03

## 2019-09-06 ENCOUNTER — HOSPITAL ENCOUNTER (OUTPATIENT)
Age: 74
Discharge: HOME | End: 2019-09-08
Payer: MEDICARE

## 2019-09-06 ENCOUNTER — OFFICE VISIT (OUTPATIENT)
Dept: PRIMARY CARE CLINIC | Age: 74
End: 2019-09-06
Payer: MEDICARE

## 2019-09-06 VITALS
SYSTOLIC BLOOD PRESSURE: 122 MMHG | OXYGEN SATURATION: 98 % | DIASTOLIC BLOOD PRESSURE: 62 MMHG | HEART RATE: 75 BPM | TEMPERATURE: 98.2 F | BODY MASS INDEX: 24.14 KG/M2 | WEIGHT: 178 LBS

## 2019-09-06 DIAGNOSIS — M48.07 SPINAL STENOSIS OF LUMBOSACRAL REGION: ICD-10-CM

## 2019-09-06 DIAGNOSIS — I25.10 CORONARY ARTERY DISEASE INVOLVING NATIVE HEART WITHOUT ANGINA PECTORIS, UNSPECIFIED VESSEL OR LESION TYPE: Primary | ICD-10-CM

## 2019-09-06 DIAGNOSIS — I25.10 CORONARY ARTERY DISEASE INVOLVING NATIVE HEART WITHOUT ANGINA PECTORIS, UNSPECIFIED VESSEL OR LESION TYPE: ICD-10-CM

## 2019-09-06 LAB
CHOLESTEROL, TOTAL: 155 MG/DL (ref 0–199)
HDLC SERPL-MCNC: 41 MG/DL
LDL CHOLESTEROL CALCULATED: 91 MG/DL (ref 0–99)
TRIGL SERPL-MCNC: 116 MG/DL (ref 0–149)
VLDLC SERPL CALC-MCNC: 23 MG/DL

## 2019-09-06 PROCEDURE — 99213 OFFICE O/P EST LOW 20 MIN: CPT | Performed by: FAMILY MEDICINE

## 2019-09-06 PROCEDURE — 80061 LIPID PANEL: CPT

## 2019-09-06 ASSESSMENT — ENCOUNTER SYMPTOMS: BACK PAIN: 0

## 2019-09-06 NOTE — PROGRESS NOTES
Back:    Lymphadenopathy:     He has no cervical adenopathy. Neurological: He is alert and oriented to person, place, and time. Skin: Skin is warm and dry. Psychiatric: He has a normal mood and affect. ASSESSMENT AND PLAN:    Lauryn Garcia was seen today for other. Diagnoses and all orders for this visit:    Coronary artery disease involving native heart without angina pectoris, unspecified vessel or lesion type  -     Lipid Panel; Future    Spinal stenosis of lumbosacral region    - removed 29 staples from his lumbar sacral midline surgical site. - continue current dose of Zocor of 60 mg daily and consider increasing to 80 of changing to a different statin if ldl > 70. Return for as scheduled, or for acute problem.     Malika Tyler, DO

## 2019-09-10 ENCOUNTER — TELEPHONE (OUTPATIENT)
Dept: PRIMARY CARE CLINIC | Age: 74
End: 2019-09-10

## 2019-09-10 DIAGNOSIS — I25.10 CORONARY ARTERY DISEASE INVOLVING NATIVE HEART WITHOUT ANGINA PECTORIS, UNSPECIFIED VESSEL OR LESION TYPE: Primary | ICD-10-CM

## 2019-09-10 RX ORDER — ROSUVASTATIN CALCIUM 40 MG/1
40 TABLET, COATED ORAL EVERY EVENING
Qty: 30 TABLET | Refills: 5 | Status: SHIPPED | OUTPATIENT
Start: 2019-09-10 | End: 2019-09-17 | Stop reason: SINTOL

## 2019-09-13 ENCOUNTER — TELEPHONE (OUTPATIENT)
Dept: PRIMARY CARE CLINIC | Age: 74
End: 2019-09-13

## 2019-09-13 NOTE — TELEPHONE ENCOUNTER
Patient recently began taking rosuvastatin and says he has been light-headed ever since. Please advise.

## 2019-09-17 DIAGNOSIS — I25.119 CORONARY ARTERY DISEASE INVOLVING NATIVE HEART WITH ANGINA PECTORIS, UNSPECIFIED VESSEL OR LESION TYPE (HCC): ICD-10-CM

## 2019-09-17 DIAGNOSIS — E78.2 MIXED HYPERLIPIDEMIA: ICD-10-CM

## 2019-09-17 RX ORDER — SIMVASTATIN 80 MG
80 TABLET ORAL NIGHTLY
Qty: 30 TABLET | Refills: 5 | Status: SHIPPED
Start: 2019-09-17 | End: 2020-03-09 | Stop reason: SDUPTHER

## 2019-09-17 NOTE — TELEPHONE ENCOUNTER
Patient unable to tolerate Crestor due to side effects i.e. dizziness. Therefore we will go back on Zocor but at 80 mg a day.

## 2019-09-24 ENCOUNTER — APPOINTMENT (OUTPATIENT)
Dept: GENERAL RADIOLOGY | Age: 74
End: 2019-09-24
Payer: MEDICARE

## 2019-09-24 ENCOUNTER — HOSPITAL ENCOUNTER (EMERGENCY)
Age: 74
Discharge: HOME | End: 2019-09-24
Attending: EMERGENCY MEDICINE
Payer: MEDICARE

## 2019-09-24 VITALS
SYSTOLIC BLOOD PRESSURE: 127 MMHG | OXYGEN SATURATION: 98 % | HEIGHT: 72 IN | WEIGHT: 177 LBS | HEART RATE: 77 BPM | BODY MASS INDEX: 23.98 KG/M2 | TEMPERATURE: 97.6 F | DIASTOLIC BLOOD PRESSURE: 65 MMHG | RESPIRATION RATE: 16 BRPM

## 2019-09-24 DIAGNOSIS — M54.16 LUMBAR RADICULOPATHY: ICD-10-CM

## 2019-09-24 DIAGNOSIS — G89.4 CHRONIC PAIN SYNDROME: ICD-10-CM

## 2019-09-24 DIAGNOSIS — M54.31 SCIATICA OF RIGHT SIDE: Primary | ICD-10-CM

## 2019-09-24 DIAGNOSIS — M48.062 SPINAL STENOSIS OF LUMBAR REGION WITH NEUROGENIC CLAUDICATION: ICD-10-CM

## 2019-09-24 PROCEDURE — 6360000002 HC RX W HCPCS: Performed by: PHYSICIAN ASSISTANT

## 2019-09-24 PROCEDURE — 96372 THER/PROPH/DIAG INJ SC/IM: CPT

## 2019-09-24 PROCEDURE — 6370000000 HC RX 637 (ALT 250 FOR IP): Performed by: PHYSICIAN ASSISTANT

## 2019-09-24 PROCEDURE — 72100 X-RAY EXAM L-S SPINE 2/3 VWS: CPT

## 2019-09-24 PROCEDURE — 73502 X-RAY EXAM HIP UNI 2-3 VIEWS: CPT

## 2019-09-24 PROCEDURE — 99283 EMERGENCY DEPT VISIT LOW MDM: CPT

## 2019-09-24 RX ORDER — ORPHENADRINE CITRATE 30 MG/ML
60 INJECTION INTRAMUSCULAR; INTRAVENOUS ONCE
Status: COMPLETED | OUTPATIENT
Start: 2019-09-24 | End: 2019-09-24

## 2019-09-24 RX ORDER — OXYCODONE HYDROCHLORIDE AND ACETAMINOPHEN 5; 325 MG/1; MG/1
1 TABLET ORAL ONCE
Status: COMPLETED | OUTPATIENT
Start: 2019-09-24 | End: 2019-09-24

## 2019-09-24 RX ORDER — PREDNISONE 20 MG/1
40 TABLET ORAL DAILY
Qty: 10 TABLET | Refills: 0 | Status: SHIPPED | OUTPATIENT
Start: 2019-09-24 | End: 2019-09-25

## 2019-09-24 RX ORDER — PREDNISONE 20 MG/1
60 TABLET ORAL ONCE
Status: COMPLETED | OUTPATIENT
Start: 2019-09-24 | End: 2019-09-24

## 2019-09-24 RX ORDER — OXYCODONE HYDROCHLORIDE AND ACETAMINOPHEN 5; 325 MG/1; MG/1
1 TABLET ORAL EVERY 6 HOURS PRN
Qty: 8 TABLET | Refills: 0 | Status: SHIPPED | OUTPATIENT
Start: 2019-09-24 | End: 2019-09-26

## 2019-09-24 RX ADMIN — ORPHENADRINE CITRATE 60 MG: 30 INJECTION INTRAMUSCULAR; INTRAVENOUS at 13:50

## 2019-09-24 RX ADMIN — OXYCODONE HYDROCHLORIDE AND ACETAMINOPHEN 1 TABLET: 5; 325 TABLET ORAL at 14:37

## 2019-09-24 RX ADMIN — PREDNISONE 60 MG: 20 TABLET ORAL at 13:50

## 2019-09-24 ASSESSMENT — PAIN SCALES - GENERAL: PAINLEVEL_OUTOF10: 9

## 2019-09-24 NOTE — ED PROVIDER NOTES
ED Attending  CC: Roula       Department of Emergency Medicine   ED  Provider Note  Admit Date/RoomTime: 9/24/2019 12:56 PM  ED Room: 32/32  Chief Complaint:   Leg Pain (right, sudden onset right hip pain radiating down leg, tingling to right upper posterior leg, pt denies pain when sitting, states lower back surgery at [facility] 1 month ago where they \"cleaned out the arthritis\")    History of Present Illness   Source of history provided by:  patient. History/Exam Limitations: none. Ethel Goodpasture is a 76 y.o. old male who has a past medical history of:   Past Medical History:   Diagnosis Date    CAD (coronary artery disease)     Dr. Mason Lopez Chronic back pain     Colon polyp     Kidney stones     Sciatica     Sinusitis     TIA (transient ischemic attack)     Unspecified cerebral artery occlusion with cerebral infarction March 2013    TIA- no deficits     presents to the emergency department by private vehicle, for acute, sharp and stabbing right sided lower lumbar spine and sacral spine pain with radiation, to the right foot, for a few minute(s) prior to arrival.  The pain was caused by no known injury. He has a history of previous osteoarthritis of lumbar spine and previous spinal surgery - decompression lamiectomy. Patient had his surgery on August 26 and has been recovering well. He has been walking 2 miles every single day. Patient had this at the [facility] clinic. Since onset the symptoms have been constant and mild in severity. Patient states he was walking out of the grocery store and started to get the sharp shooting pains and had to sit down and then be carried to the car. There has been no bowel or bladder incontinence associated with the pain.   There have been associated symptoms of nothing additional and denies any weakness, fevers, chills, numbness, incontinence, dysuria, hematuria, abdominal pain, recent steroid use, tingling, morning stiffness, leg pain, leg weakness, new numbness,

## 2019-09-25 ENCOUNTER — HOSPITAL ENCOUNTER (EMERGENCY)
Age: 74
Discharge: HOME | End: 2019-09-25
Attending: EMERGENCY MEDICINE
Payer: MEDICARE

## 2019-09-25 ENCOUNTER — APPOINTMENT (OUTPATIENT)
Dept: CT IMAGING | Age: 74
End: 2019-09-25
Payer: MEDICARE

## 2019-09-25 ENCOUNTER — APPOINTMENT (OUTPATIENT)
Dept: ULTRASOUND IMAGING | Age: 74
End: 2019-09-25
Payer: MEDICARE

## 2019-09-25 ENCOUNTER — APPOINTMENT (OUTPATIENT)
Dept: MRI IMAGING | Age: 74
End: 2019-09-25
Payer: MEDICARE

## 2019-09-25 VITALS
HEIGHT: 72 IN | BODY MASS INDEX: 24.38 KG/M2 | WEIGHT: 180 LBS | DIASTOLIC BLOOD PRESSURE: 69 MMHG | SYSTOLIC BLOOD PRESSURE: 128 MMHG | RESPIRATION RATE: 14 BRPM | HEART RATE: 62 BPM | TEMPERATURE: 98 F | OXYGEN SATURATION: 98 %

## 2019-09-25 DIAGNOSIS — R33.9 URINARY RETENTION: Primary | ICD-10-CM

## 2019-09-25 DIAGNOSIS — M54.16 LUMBAR BACK PAIN WITH RADICULOPATHY AFFECTING RIGHT LOWER EXTREMITY: ICD-10-CM

## 2019-09-25 LAB
ALBUMIN SERPL-MCNC: 4.2 G/DL (ref 3.5–5.2)
ALP BLD-CCNC: 58 U/L (ref 40–129)
ALT SERPL-CCNC: 12 U/L (ref 0–40)
ANION GAP SERPL CALCULATED.3IONS-SCNC: 9 MMOL/L (ref 7–16)
AST SERPL-CCNC: 13 U/L (ref 0–39)
BACTERIA: ABNORMAL /HPF
BASOPHILS ABSOLUTE: 0.02 E9/L (ref 0–0.2)
BASOPHILS RELATIVE PERCENT: 0.3 % (ref 0–2)
BILIRUB SERPL-MCNC: 0.3 MG/DL (ref 0–1.2)
BILIRUBIN URINE: NEGATIVE
BLOOD, URINE: NEGATIVE
BUN BLDV-MCNC: 9 MG/DL (ref 8–23)
CALCIUM SERPL-MCNC: 9.3 MG/DL (ref 8.6–10.2)
CHLORIDE BLD-SCNC: 104 MMOL/L (ref 98–107)
CLARITY: CLEAR
CO2: 27 MMOL/L (ref 22–29)
COLOR: YELLOW
CREAT SERPL-MCNC: 0.8 MG/DL (ref 0.7–1.2)
EOSINOPHILS ABSOLUTE: 0.03 E9/L (ref 0.05–0.5)
EOSINOPHILS RELATIVE PERCENT: 0.4 % (ref 0–6)
GFR AFRICAN AMERICAN: >60
GFR NON-AFRICAN AMERICAN: >60 ML/MIN/1.73
GLUCOSE BLD-MCNC: 115 MG/DL (ref 74–99)
GLUCOSE URINE: NEGATIVE MG/DL
HCT VFR BLD CALC: 34.3 % (ref 37–54)
HEMOGLOBIN: 10.9 G/DL (ref 12.5–16.5)
IMMATURE GRANULOCYTES #: 0.01 E9/L
IMMATURE GRANULOCYTES %: 0.1 % (ref 0–5)
KETONES, URINE: NEGATIVE MG/DL
LEUKOCYTE ESTERASE, URINE: ABNORMAL
LYMPHOCYTES ABSOLUTE: 1.32 E9/L (ref 1.5–4)
LYMPHOCYTES RELATIVE PERCENT: 18.4 % (ref 20–42)
MCH RBC QN AUTO: 28.9 PG (ref 26–35)
MCHC RBC AUTO-ENTMCNC: 31.8 % (ref 32–34.5)
MCV RBC AUTO: 91 FL (ref 80–99.9)
MONOCYTES ABSOLUTE: 0.92 E9/L (ref 0.1–0.95)
MONOCYTES RELATIVE PERCENT: 12.8 % (ref 2–12)
NEUTROPHILS ABSOLUTE: 4.86 E9/L (ref 1.8–7.3)
NEUTROPHILS RELATIVE PERCENT: 68 % (ref 43–80)
NITRITE, URINE: NEGATIVE
PDW BLD-RTO: 12.2 FL (ref 11.5–15)
PH UA: 6.5 (ref 5–9)
PLATELET # BLD: 176 E9/L (ref 130–450)
PMV BLD AUTO: 9.2 FL (ref 7–12)
POTASSIUM SERPL-SCNC: 3.8 MMOL/L (ref 3.5–5)
PROTEIN UA: NEGATIVE MG/DL
RBC # BLD: 3.77 E12/L (ref 3.8–5.8)
RBC UA: ABNORMAL /HPF (ref 0–2)
SODIUM BLD-SCNC: 140 MMOL/L (ref 132–146)
SPECIFIC GRAVITY UA: 1.01 (ref 1–1.03)
TOTAL PROTEIN: 6.7 G/DL (ref 6.4–8.3)
UROBILINOGEN, URINE: 0.2 E.U./DL
WBC # BLD: 7.2 E9/L (ref 4.5–11.5)
WBC UA: ABNORMAL /HPF (ref 0–5)

## 2019-09-25 PROCEDURE — 81001 URINALYSIS AUTO W/SCOPE: CPT

## 2019-09-25 PROCEDURE — A9579 GAD-BASE MR CONTRAST NOS,1ML: HCPCS | Performed by: RADIOLOGY

## 2019-09-25 PROCEDURE — 6370000000 HC RX 637 (ALT 250 FOR IP): Performed by: EMERGENCY MEDICINE

## 2019-09-25 PROCEDURE — 85025 COMPLETE CBC W/AUTO DIFF WBC: CPT

## 2019-09-25 PROCEDURE — 80053 COMPREHEN METABOLIC PANEL: CPT

## 2019-09-25 PROCEDURE — 72158 MRI LUMBAR SPINE W/O & W/DYE: CPT

## 2019-09-25 PROCEDURE — 6360000004 HC RX CONTRAST MEDICATION: Performed by: RADIOLOGY

## 2019-09-25 PROCEDURE — 51702 INSERT TEMP BLADDER CATH: CPT

## 2019-09-25 PROCEDURE — 74177 CT ABD & PELVIS W/CONTRAST: CPT

## 2019-09-25 PROCEDURE — 99284 EMERGENCY DEPT VISIT MOD MDM: CPT

## 2019-09-25 PROCEDURE — 96374 THER/PROPH/DIAG INJ IV PUSH: CPT

## 2019-09-25 PROCEDURE — 93971 EXTREMITY STUDY: CPT

## 2019-09-25 RX ORDER — METHYLPREDNISOLONE 4 MG/1
TABLET ORAL
Qty: 1 KIT | Refills: 0 | Status: SHIPPED | OUTPATIENT
Start: 2019-09-25 | End: 2019-10-01

## 2019-09-25 RX ORDER — OXYCODONE HYDROCHLORIDE AND ACETAMINOPHEN 5; 325 MG/1; MG/1
1 TABLET ORAL ONCE
Status: COMPLETED | OUTPATIENT
Start: 2019-09-25 | End: 2019-09-25

## 2019-09-25 RX ADMIN — IOPAMIDOL 110 ML: 755 INJECTION, SOLUTION INTRAVENOUS at 11:57

## 2019-09-25 RX ADMIN — GADOTERIDOL 16 ML: 279.3 INJECTION, SOLUTION INTRAVENOUS at 14:55

## 2019-09-25 RX ADMIN — OXYCODONE HYDROCHLORIDE AND ACETAMINOPHEN 1 TABLET: 5; 325 TABLET ORAL at 15:45

## 2019-09-25 ASSESSMENT — PAIN SCALES - GENERAL: PAINLEVEL_OUTOF10: 10

## 2020-03-09 RX ORDER — SIMVASTATIN 80 MG
80 TABLET ORAL NIGHTLY
Qty: 30 TABLET | Refills: 0 | Status: SHIPPED
Start: 2020-03-09 | End: 2020-04-07 | Stop reason: SDUPTHER

## 2020-04-07 RX ORDER — SIMVASTATIN 80 MG
80 TABLET ORAL NIGHTLY
Qty: 30 TABLET | Refills: 0 | Status: SHIPPED
Start: 2020-04-07 | End: 2020-04-09 | Stop reason: SDUPTHER

## 2020-04-09 RX ORDER — SIMVASTATIN 80 MG
80 TABLET ORAL NIGHTLY
Qty: 30 TABLET | Refills: 0 | Status: SHIPPED
Start: 2020-04-09 | End: 2020-05-11 | Stop reason: SDUPTHER

## 2020-05-11 RX ORDER — SIMVASTATIN 80 MG
80 TABLET ORAL NIGHTLY
Qty: 90 TABLET | Refills: 0 | Status: SHIPPED
Start: 2020-05-11 | End: 2020-08-06 | Stop reason: SDUPTHER

## 2020-05-28 ENCOUNTER — TELEPHONE (OUTPATIENT)
Dept: ADMINISTRATIVE | Age: 75
End: 2020-05-28

## 2020-06-08 ENCOUNTER — OFFICE VISIT (OUTPATIENT)
Dept: PRIMARY CARE CLINIC | Age: 75
End: 2020-06-08
Payer: MEDICARE

## 2020-06-08 ENCOUNTER — HOSPITAL ENCOUNTER (OUTPATIENT)
Age: 75
Discharge: HOME | End: 2020-06-10
Payer: MEDICARE

## 2020-06-08 VITALS
DIASTOLIC BLOOD PRESSURE: 64 MMHG | HEART RATE: 67 BPM | BODY MASS INDEX: 24.34 KG/M2 | OXYGEN SATURATION: 94 % | WEIGHT: 177 LBS | SYSTOLIC BLOOD PRESSURE: 126 MMHG | TEMPERATURE: 97 F

## 2020-06-08 LAB
ALBUMIN SERPL-MCNC: 4.5 G/DL (ref 3.5–5.2)
ALP BLD-CCNC: 61 U/L (ref 40–129)
ALT SERPL-CCNC: 23 U/L (ref 0–40)
ANION GAP SERPL CALCULATED.3IONS-SCNC: 14 MMOL/L (ref 7–16)
AST SERPL-CCNC: 32 U/L (ref 0–39)
BILIRUB SERPL-MCNC: 0.4 MG/DL (ref 0–1.2)
BUN BLDV-MCNC: 11 MG/DL (ref 8–23)
CALCIUM SERPL-MCNC: 9.5 MG/DL (ref 8.6–10.2)
CHLORIDE BLD-SCNC: 103 MMOL/L (ref 98–107)
CHOLESTEROL, TOTAL: 153 MG/DL (ref 0–199)
CO2: 24 MMOL/L (ref 22–29)
CREAT SERPL-MCNC: 0.8 MG/DL (ref 0.7–1.2)
GFR AFRICAN AMERICAN: >60
GFR NON-AFRICAN AMERICAN: >60 ML/MIN/1.73
GLUCOSE BLD-MCNC: 102 MG/DL (ref 74–99)
HDLC SERPL-MCNC: 64 MG/DL
LDL CHOLESTEROL CALCULATED: 75 MG/DL (ref 0–99)
POTASSIUM SERPL-SCNC: 4.5 MMOL/L (ref 3.5–5)
SODIUM BLD-SCNC: 141 MMOL/L (ref 132–146)
TOTAL PROTEIN: 7.8 G/DL (ref 6.4–8.3)
TRIGL SERPL-MCNC: 69 MG/DL (ref 0–149)
VLDLC SERPL CALC-MCNC: 14 MG/DL

## 2020-06-08 PROCEDURE — 80061 LIPID PANEL: CPT

## 2020-06-08 PROCEDURE — 1036F TOBACCO NON-USER: CPT | Performed by: FAMILY MEDICINE

## 2020-06-08 PROCEDURE — G8420 CALC BMI NORM PARAMETERS: HCPCS | Performed by: FAMILY MEDICINE

## 2020-06-08 PROCEDURE — 80053 COMPREHEN METABOLIC PANEL: CPT

## 2020-06-08 PROCEDURE — 3017F COLORECTAL CA SCREEN DOC REV: CPT | Performed by: FAMILY MEDICINE

## 2020-06-08 PROCEDURE — 99213 OFFICE O/P EST LOW 20 MIN: CPT | Performed by: FAMILY MEDICINE

## 2020-06-08 PROCEDURE — G8427 DOCREV CUR MEDS BY ELIG CLIN: HCPCS | Performed by: FAMILY MEDICINE

## 2020-06-08 PROCEDURE — 1123F ACP DISCUSS/DSCN MKR DOCD: CPT | Performed by: FAMILY MEDICINE

## 2020-06-08 PROCEDURE — 4040F PNEUMOC VAC/ADMIN/RCVD: CPT | Performed by: FAMILY MEDICINE

## 2020-06-08 ASSESSMENT — ENCOUNTER SYMPTOMS
BACK PAIN: 0
SHORTNESS OF BREATH: 0

## 2020-06-08 ASSESSMENT — PATIENT HEALTH QUESTIONNAIRE - PHQ9
2. FEELING DOWN, DEPRESSED OR HOPELESS: 0
SUM OF ALL RESPONSES TO PHQ QUESTIONS 1-9: 0
SUM OF ALL RESPONSES TO PHQ9 QUESTIONS 1 & 2: 0
1. LITTLE INTEREST OR PLEASURE IN DOING THINGS: 0
SUM OF ALL RESPONSES TO PHQ QUESTIONS 1-9: 0

## 2020-06-09 ENCOUNTER — TELEPHONE (OUTPATIENT)
Dept: PRIMARY CARE CLINIC | Age: 75
End: 2020-06-09

## 2020-08-06 RX ORDER — SIMVASTATIN 80 MG
80 TABLET ORAL NIGHTLY
Qty: 90 TABLET | Refills: 0 | Status: SHIPPED
Start: 2020-08-06 | End: 2020-10-30 | Stop reason: SDUPTHER

## 2020-09-15 ENCOUNTER — OFFICE VISIT (OUTPATIENT)
Dept: CARDIOLOGY CLINIC | Age: 75
End: 2020-09-15
Payer: MEDICARE

## 2020-09-15 VITALS
WEIGHT: 180.7 LBS | HEIGHT: 71 IN | RESPIRATION RATE: 18 BRPM | HEART RATE: 63 BPM | DIASTOLIC BLOOD PRESSURE: 70 MMHG | BODY MASS INDEX: 25.3 KG/M2 | SYSTOLIC BLOOD PRESSURE: 110 MMHG

## 2020-09-15 PROCEDURE — G8427 DOCREV CUR MEDS BY ELIG CLIN: HCPCS | Performed by: INTERNAL MEDICINE

## 2020-09-15 PROCEDURE — 1036F TOBACCO NON-USER: CPT | Performed by: INTERNAL MEDICINE

## 2020-09-15 PROCEDURE — G8417 CALC BMI ABV UP PARAM F/U: HCPCS | Performed by: INTERNAL MEDICINE

## 2020-09-15 PROCEDURE — 3017F COLORECTAL CA SCREEN DOC REV: CPT | Performed by: INTERNAL MEDICINE

## 2020-09-15 PROCEDURE — 4040F PNEUMOC VAC/ADMIN/RCVD: CPT | Performed by: INTERNAL MEDICINE

## 2020-09-15 PROCEDURE — 93000 ELECTROCARDIOGRAM COMPLETE: CPT | Performed by: INTERNAL MEDICINE

## 2020-09-15 PROCEDURE — 99213 OFFICE O/P EST LOW 20 MIN: CPT | Performed by: INTERNAL MEDICINE

## 2020-09-15 PROCEDURE — 1123F ACP DISCUSS/DSCN MKR DOCD: CPT | Performed by: INTERNAL MEDICINE

## 2020-09-15 RX ORDER — GABAPENTIN 800 MG/1
800 TABLET ORAL 3 TIMES DAILY
COMMUNITY
End: 2021-08-31 | Stop reason: SDUPTHER

## 2020-09-15 RX ORDER — TAMSULOSIN HYDROCHLORIDE 0.4 MG/1
0.4 CAPSULE ORAL DAILY
COMMUNITY

## 2020-09-15 NOTE — PROGRESS NOTES
OFFICE FOLLOW-UP    Name: Audie Whipple  Age: 76 y.o. Primary Care Physician: Cara Bernstein DO    Date of Service: 9/15/2020    Chief Complaint: Follow-up for CAD s/p CABG on 11/4/13    Interim History:   No new cardiac complaints since hospital discharge or last office visit. No complaints of angina, dyspnea on exertion, palpitations, dizziness, lightheadedness, or pre-syncopal episodes (he still walks/rides bike 2 miles/day, golfs, etc with no cardiac complaints). Review of Systems:   Cardiac: As per HPI  General: No fever, chills  Pulmonary: As per HPI  HEENT: No visual disturbances, difficult swallowing  GI: No nausea, vomiting, abdominal pain  Endocrine: No thyroid disease or diabetes  Musculoskeletal: RIBEIRO x 4, no focal motor deficits  Skin: Intact, no rashes  Neuro/Psych: No headache or seizures    Past Medical History:  Past Medical History:   Diagnosis Date    CAD (coronary artery disease)     Dr. Sarthak Palacios     Chronic back pain     Colon polyp     Kidney stones     Sciatica     Sinusitis     TIA (transient ischemic attack)     Unspecified cerebral artery occlusion with cerebral infarction March 2013    TIA- no deficits      Past Surgical History:  Past Surgical History:   Procedure Laterality Date    COLONOSCOPY  08/04/2015    COLONOSCOPY  09/05/2018    Milosevic    CORONARY ARTERY BYPASS GRAFT  11/01/2013    x 4  Dr. Damián Hernández ENDOSCOPY, COLON, DIAGNOSTIC      EPIDURAL STEROID INJECTION N/A 6/13/2019    L5-S1 EPIDURAL STEROID INJECTION performed [facility] Gema Reynolds DO at [facility]?     INGUINAL HERNIA REPAIR Right 11/09/2018    Milosevic    LUMBAR SPINE SURGERY  08/26/2019    decompression L3-L5 [place] [facility] [place]    TONSILLECTOMY       Social History:  Social History     Socioeconomic History    Marital status:      Spouse name: Not on file    Number of children: Not on file    Years of education: Not on file    Highest education level: Not on file Wt 180 lb 11.2 oz (82 kg)   BMI 25.20 kg/m²   Wt Readings from Last 3 Encounters:   09/15/20 180 lb 11.2 oz (82 kg)   06/08/20 177 lb (80.3 kg)   09/25/19 180 lb (81.6 kg)     Appearance: Awake, alert, no acute respiratory distress  Skin: Intact, no rash  Head: Normocephalic, atraumatic  Eyes: EOMI, no conjunctival erythema  ENMT: No pharyngeal erythema, MMM, no rhinorrhea  Neck: Supple, no elevated JVP, no carotid bruits  Lungs: Clear to auscultation bilaterally. No wheezes, rales, or rhonchi.   Cardiac: Regular rate and rhythm, +S1S2, no murmurs apparent  Abdomen: Soft, nontender, +bowel sounds  Extremities: Moves all extremities x 4, no lower extremity edema  Neurologic: No focal motor deficits apparent, normal mood and affect  Peripheral Pulses: Intact posterior tibial pulses bilaterally    Laboratory Tests:  Lab Results   Component Value Date    CREATININE 0.8 06/08/2020    BUN 11 06/08/2020     06/08/2020    K 4.5 06/08/2020     06/08/2020    CO2 24 06/08/2020     Lab Results   Component Value Date    WBC 7.2 09/25/2019    RBC 3.77 09/25/2019    HGB 10.9 09/25/2019    HCT 34.3 09/25/2019    MCV 91.0 09/25/2019    MCH 28.9 09/25/2019    MCHC 31.8 09/25/2019    RDW 12.2 09/25/2019     09/25/2019    MPV 9.2 09/25/2019     Lab Results   Component Value Date    MG 2.3 11/05/2013     Lab Results   Component Value Date    PROTIME 14.3 11/04/2013    INR 1.5 11/04/2013     Lab Results   Component Value Date    TSH 1.115 11/03/2013     Lab Results   Component Value Date    TRIG 69 06/08/2020    TRIG 116 09/06/2019    TRIG 96 07/18/2019     Lab Results   Component Value Date    HDL 64 06/08/2020    HDL 41 09/06/2019    HDL 70 07/18/2019     Lab Results   Component Value Date    LDLCALC 75 06/08/2020    [address] 91 09/06/2019    LDLCALC 87 07/18/2019     Cardiac Tests:  ECG: SR, rate 71, NSSTT changes    ASSESSMENT / PLAN:  1. CAD/NSTEMI s/p CABG on 11/4/13 (LIMA-LAD, SVG-D1, SVG-OM1, and SVG-PDA) -- currently with no active cardiac complaints  2. Prior TIA  3. Normal LV systolic function with no wall motion abnormalities on LV gram (EF 60%)  4.  History of sinus bradycardia    - Continue ASA and statin (refill for statin sent today)  - Results of 6/2018 lipid panel reviewed with the patient (discussed option of switching to lipitor or crestor) / repeat lipid panel pending  - Beta blocker discontinued at prior office visit due to bradycardia and dizziness (no further complaints)  - Follow-up in 1 year      Harriet Bates MD  [facility]) Cardiology

## 2020-09-15 NOTE — PROGRESS NOTES
OFFICE FOLLOW-UP    Name: Stephon To  Age: 76 y.o. Primary Care Physician: Rosita Xiao DO    Date of Service: 9/15/2020    Chief Complaint: Follow-up for CAD s/p CABG on 11/4/13    Interim History:   No new cardiac complaints since hospital discharge or last office visit. No complaints of angina, dyspnea on exertion, palpitations, dizziness, lightheadedness, or pre-syncopal episodes (he still walks/rides bike 2 miles/day, golfs, etc with no cardiac complaints). Review of Systems:   Cardiac: As per HPI  General: No fever, chills  Pulmonary: As per HPI  HEENT: No visual disturbances, difficult swallowing  GI: No nausea, vomiting, abdominal pain  Endocrine: No thyroid disease or diabetes  Musculoskeletal: RIBEIRO x 4, no focal motor deficits  Skin: Intact, no rashes  Neuro/Psych: No headache or seizures    Past Medical History:  Past Medical History:   Diagnosis Date    CAD (coronary artery disease)     Dr. Elva Miller     Chronic back pain     Colon polyp     Kidney stones     Sciatica     Sinusitis     TIA (transient ischemic attack)     Unspecified cerebral artery occlusion with cerebral infarction March 2013    TIA- no deficits      Past Surgical History:  Past Surgical History:   Procedure Laterality Date    COLONOSCOPY  08/04/2015    COLONOSCOPY  09/05/2018    Milosevic    CORONARY ARTERY BYPASS GRAFT  11/01/2013    x 4  Dr. Rolando Edwards ENDOSCOPY, COLON, DIAGNOSTIC      EPIDURAL STEROID INJECTION N/A 6/13/2019    L5-S1 EPIDURAL STEROID INJECTION performed [facility] Tod Shen [facility] at [facility]?     INGUINAL HERNIA REPAIR Right 11/09/2018    Milosevic    LUMBAR SPINE SURGERY  08/26/2019    decompression L3-L5 [place] at [facility] [place]    TONSILLECTOMY       Social History:  Social History     Socioeconomic History    Marital status:      Spouse name: Not on file    Number of children: Not on file    Years of education: Not on file    Highest education level: Not on file Occupational History    Not on file   Social Needs    Financial resource strain: Not on file    Food insecurity     Worry: Not on file     Inability: Not on file    Transportation needs     Medical: Not on file     Non-medical: Not on file   Tobacco Use    Smoking status: Former Smoker     Packs/day: 1.00     Years: 30.00     Pack years: 30.00     Last attempt to quit: 1966     Years since quittin.9    Smokeless tobacco: Never Used   Substance and Sexual Activity    Alcohol use: Not Currently     Alcohol/week: 0.0 standard drinks    Drug use: No    Sexual activity: Not on file   Lifestyle    Physical activity     Days per week: Not on file     Minutes per session: Not on file    Stress: Not on file   Relationships    Social connections     Talks on phone: Not on file     Gets together: Not on file     Attends Tenriism service: Not on file     Active member of club or organization: Not on file     Attends meetings of clubs or organizations: Not on file     Relationship status: Not on file    Intimate partner violence     Fear of current or ex partner: Not on file     Emotionally abused: Not on file     Physically abused: Not on file     Forced sexual activity: Not on file   Other Topics Concern    Not on file   Social History Narrative    Not on file     Allergies:  No Known Allergies    Current Medications:  Current Outpatient Medications   Medication Sig Dispense Refill    tamsulosin (FLOMAX) 0.4 MG capsule Take 0.4 mg by mouth daily      gabapentin (NEURONTIN) 800 MG tablet Take 800 mg by mouth 3 times daily.  simvastatin (ZOCOR) 80 MG tablet Take 1 tablet by mouth nightly 90 tablet 0    aspirin 81 MG chewable tablet Take 1 tablet by mouth daily. 30 tablet 3    fluticasone (FLONASE) 50 MCG/ACT nasal spray 1 spray by Nasal route daily. No current facility-administered medications for this visit.       Physical Exam:  /70   Pulse 63   Resp 18   Ht 5' 11\" (1.803 m) Wt 180 lb 11.2 oz (82 kg)   BMI 25.20 kg/m²   Wt Readings from Last 3 Encounters:   09/15/20 180 lb 11.2 oz (82 kg)   06/08/20 177 lb (80.3 kg)   09/25/19 180 lb (81.6 kg)     Appearance: Awake, alert, no acute respiratory distress  Skin: Intact, no rash  Head: Normocephalic, atraumatic  Eyes: EOMI, no conjunctival erythema  ENMT: No pharyngeal erythema, MMM, no rhinorrhea  Neck: Supple, no elevated JVP, no carotid bruits  Lungs: Clear to auscultation bilaterally. No wheezes, rales, or rhonchi.   Cardiac: Regular rate and rhythm, +S1S2, no murmurs apparent  Abdomen: Soft, nontender, +bowel sounds  Extremities: Moves all extremities x 4, no lower extremity edema  Neurologic: No focal motor deficits apparent, normal mood and affect  Peripheral Pulses: Intact posterior tibial pulses bilaterally    Laboratory Tests:  Lab Results   Component Value Date    CREATININE 0.8 06/08/2020    BUN 11 06/08/2020     06/08/2020    K 4.5 06/08/2020     06/08/2020    CO2 24 06/08/2020     Lab Results   Component Value Date    WBC 7.2 09/25/2019    RBC 3.77 09/25/2019    HGB 10.9 09/25/2019    HCT 34.3 09/25/2019    MCV 91.0 09/25/2019    MCH 28.9 09/25/2019    MCHC 31.8 09/25/2019    RDW 12.2 09/25/2019     09/25/2019    MPV 9.2 09/25/2019     Lab Results   Component Value Date    MG 2.3 11/05/2013     Lab Results   Component Value Date    PROTIME 14.3 11/04/2013    INR 1.5 11/04/2013     Lab Results   Component Value Date    TSH 1.115 11/03/2013     Lab Results   Component Value Date    TRIG 69 06/08/2020    TRIG 116 09/06/2019    TRIG 96 07/18/2019     Lab Results   Component Value Date    HDL 64 06/08/2020    HDL 41 09/06/2019    HDL 70 07/18/2019     Lab Results   Component Value Date    LDLCALC 75 06/08/2020    [address] 91 09/06/2019    LDLCALC 87 07/18/2019     Cardiac Tests:  ECG: SR, rate 63, NSSTT changes    ASSESSMENT / PLAN:  1. CAD/NSTEMI s/p CABG on 11/4/13 (LIMA-LAD, SVG-D1, SVG-OM1, and SVG-PDA) -- currently with no active cardiac complaints  2. Prior TIA  3. Normal LV systolic function with no wall motion abnormalities on LV gram (EF 60%)  4.  History of sinus bradycardia    - Continue ASA and statin  - Results of 6/2020 lipid panel reviewed  - Beta blocker discontinued at prior office visit due to bradycardia and dizziness (no further complaints)  - Follow-up in 1 year      Herrera Garrido MD  [facility]) Cardiology

## 2020-10-30 ENCOUNTER — VIRTUAL VISIT (OUTPATIENT)
Dept: PRIMARY CARE CLINIC | Age: 75
End: 2020-10-30
Payer: MEDICARE

## 2020-10-30 PROCEDURE — 99213 OFFICE O/P EST LOW 20 MIN: CPT | Performed by: FAMILY MEDICINE

## 2020-10-30 PROCEDURE — G8482 FLU IMMUNIZE ORDER/ADMIN: HCPCS | Performed by: FAMILY MEDICINE

## 2020-10-30 PROCEDURE — 1036F TOBACCO NON-USER: CPT | Performed by: FAMILY MEDICINE

## 2020-10-30 PROCEDURE — 1123F ACP DISCUSS/DSCN MKR DOCD: CPT | Performed by: FAMILY MEDICINE

## 2020-10-30 PROCEDURE — 4040F PNEUMOC VAC/ADMIN/RCVD: CPT | Performed by: FAMILY MEDICINE

## 2020-10-30 PROCEDURE — G8427 DOCREV CUR MEDS BY ELIG CLIN: HCPCS | Performed by: FAMILY MEDICINE

## 2020-10-30 PROCEDURE — 3017F COLORECTAL CA SCREEN DOC REV: CPT | Performed by: FAMILY MEDICINE

## 2020-10-30 PROCEDURE — G8417 CALC BMI ABV UP PARAM F/U: HCPCS | Performed by: FAMILY MEDICINE

## 2020-10-30 RX ORDER — SIMVASTATIN 80 MG
80 TABLET ORAL NIGHTLY
Qty: 90 TABLET | Refills: 2 | Status: SHIPPED
Start: 2020-10-30 | End: 2020-11-30 | Stop reason: SDUPTHER

## 2020-10-30 ASSESSMENT — ENCOUNTER SYMPTOMS: BACK PAIN: 1

## 2020-10-30 NOTE — PROGRESS NOTES
Barbara Silva, a male of 76 y. o.did a virtual visit on 10/30/2020. Patient Active Problem List   Diagnosis    Chest pain    HTN (hypertension), benign    NSTEMI (non-ST elevated myocardial infarction) ([facility] [address]    TIA (transient ischemic attack)    Hypokalemia    CAD (coronary artery disease)    S/P CABG x 4    Chronic pain syndrome    Lumbar radiculopathy    Spinal stenosis of lumbar region with neurogenic claudication    Chronic bilateral low back pain with bilateral sciatica          Back Pain   This is a chronic problem. The current episode started more than 1 year ago. The problem occurs daily. The problem has been gradually worsening since onset. The pain is present in the sacro-iliac (R buttocks. ). The pain radiates to the right knee. The pain is worse during the night. Associated symptoms include numbness (r leg on outside of leg). Pertinent negatives include no tingling or weakness. Risk factors: had surgey in past at [facility], dr Gabe Figueroa. He has tried bed rest (gabapentin) for the symptoms. The treatment provided moderate relief.    -He is wondering if a neurostimulator could be placed to help his pain. No Known Allergies    Current Outpatient Medications on File Prior to Visit   Medication Sig Dispense Refill    tamsulosin (FLOMAX) 0.4 MG capsule Take 0.4 mg by mouth daily      gabapentin (NEURONTIN) 800 MG tablet Take 800 mg by mouth 3 times daily.  aspirin 81 MG chewable tablet Take 1 tablet by mouth daily. 30 tablet 3    fluticasone (FLONASE) 50 MCG/ACT nasal spray 1 spray by Nasal route daily. No current facility-administered medications on file prior to visit. Review of Systems   Musculoskeletal: Positive for back pain. Neurological: Positive for numbness (r leg on outside of leg). Negative for tingling and weakness. other review of systems reviewed and are negative    OBJECTIVE:  There were no vitals taken for this visit.      Physical Exam  Constitutional: General: He is not in acute distress. Appearance: Normal appearance. He is not ill-appearing, toxic-appearing or diaphoretic. HENT:      Head: Normocephalic. Eyes:      General: No scleral icterus. Pulmonary:      Effort: Pulmonary effort is normal.   Neurological:      Mental Status: He is alert and oriented to person, place, and time. Psychiatric:         Mood and Affect: Mood normal.         ASSESSMENT AND PLAN:    Kaleigh Dickey was seen today for back pain. Diagnoses and all orders for this visit:    Spinal stenosis of lumbar region with neurogenic claudication  -     Ambulatory referral to Neurosurgery    Coronary artery disease involving native heart without angina pectoris, unspecified vessel or lesion type  -     simvastatin (ZOCOR) 80 MG tablet; Take 1 tablet by mouth nightly    -Follow-up 6 weeks for Medicare physical.    Return in about 6 weeks (around 12/11/2020). Osmany Lazcano, DO    TeleMedicine Patient Consent    This visit was performed as a virtual video visit using a synchronous, two-way, audio-video telehealth technology platform. Patient identification was verified at the start of the visit, including the patient's telephone number and physical location. I discussed with the patient the nature of our telehealth visits, that:     1. Due to the nature of an audio- video modality, the only components of a physical exam that could be done are the elements supported by direct observation. 2. I would evaluate the patient and recommend diagnostics and treatments based on my assessment. 3. If it was felt that the patient should be evaluated in clinic or an emergency room setting, then they would be directed there. 4. Our sessions are not being recorded and that personal health information is protected. 5. Our team would provide follow up care in person if/when the patient needs it. Patient does agree to proceed with telemedicine consultation.     Patient's location: home address in [place]. Physician  location [facility]. other people involved in call none. Time spent: Not billed by time    This visit was completed virtually using D[employer]. me

## 2020-11-30 ENCOUNTER — OFFICE VISIT (OUTPATIENT)
Dept: PRIMARY CARE CLINIC | Age: 75
End: 2020-11-30
Payer: MEDICARE

## 2020-11-30 VITALS
SYSTOLIC BLOOD PRESSURE: 122 MMHG | DIASTOLIC BLOOD PRESSURE: 82 MMHG | BODY MASS INDEX: 25.06 KG/M2 | OXYGEN SATURATION: 98 % | HEART RATE: 68 BPM | TEMPERATURE: 97.8 F | WEIGHT: 185 LBS | HEIGHT: 72 IN

## 2020-11-30 DIAGNOSIS — I25.10 CORONARY ARTERY DISEASE INVOLVING NATIVE HEART WITHOUT ANGINA PECTORIS, UNSPECIFIED VESSEL OR LESION TYPE: ICD-10-CM

## 2020-11-30 DIAGNOSIS — Z12.5 SCREENING PSA (PROSTATE SPECIFIC ANTIGEN): ICD-10-CM

## 2020-11-30 PROBLEM — I25.119 CORONARY ARTERY DISEASE INVOLVING NATIVE HEART WITH ANGINA PECTORIS (HCC): Status: ACTIVE | Noted: 2020-11-30

## 2020-11-30 LAB
ALBUMIN SERPL-MCNC: 4.3 G/DL (ref 3.5–5.2)
ALP BLD-CCNC: 83 U/L (ref 40–129)
ALT SERPL-CCNC: 18 U/L (ref 0–40)
ANION GAP SERPL CALCULATED.3IONS-SCNC: 11 MMOL/L (ref 7–16)
AST SERPL-CCNC: 21 U/L (ref 0–39)
BILIRUB SERPL-MCNC: 0.5 MG/DL (ref 0–1.2)
BUN BLDV-MCNC: 11 MG/DL (ref 8–23)
CALCIUM SERPL-MCNC: 9.3 MG/DL (ref 8.6–10.2)
CHLORIDE BLD-SCNC: 104 MMOL/L (ref 98–107)
CHOLESTEROL, TOTAL: 142 MG/DL (ref 0–199)
CO2: 28 MMOL/L (ref 22–29)
CREAT SERPL-MCNC: 0.8 MG/DL (ref 0.7–1.2)
GFR AFRICAN AMERICAN: >60
GFR NON-AFRICAN AMERICAN: >60 ML/MIN/1.73
GLUCOSE BLD-MCNC: 93 MG/DL (ref 74–99)
HDLC SERPL-MCNC: 51 MG/DL
LDL CHOLESTEROL CALCULATED: 72 MG/DL (ref 0–99)
POTASSIUM SERPL-SCNC: 4.9 MMOL/L (ref 3.5–5)
PROSTATE SPECIFIC ANTIGEN: 2.4 NG/ML (ref 0–4)
SODIUM BLD-SCNC: 143 MMOL/L (ref 132–146)
TOTAL PROTEIN: 7.4 G/DL (ref 6.4–8.3)
TRIGL SERPL-MCNC: 96 MG/DL (ref 0–149)
VLDLC SERPL CALC-MCNC: 19 MG/DL

## 2020-11-30 PROCEDURE — G0439 PPPS, SUBSEQ VISIT: HCPCS | Performed by: FAMILY MEDICINE

## 2020-11-30 PROCEDURE — 1123F ACP DISCUSS/DSCN MKR DOCD: CPT | Performed by: FAMILY MEDICINE

## 2020-11-30 PROCEDURE — G8482 FLU IMMUNIZE ORDER/ADMIN: HCPCS | Performed by: FAMILY MEDICINE

## 2020-11-30 PROCEDURE — 3017F COLORECTAL CA SCREEN DOC REV: CPT | Performed by: FAMILY MEDICINE

## 2020-11-30 PROCEDURE — 4040F PNEUMOC VAC/ADMIN/RCVD: CPT | Performed by: FAMILY MEDICINE

## 2020-11-30 RX ORDER — SIMVASTATIN 80 MG
80 TABLET ORAL NIGHTLY
Qty: 90 TABLET | Refills: 2 | Status: SHIPPED
Start: 2020-11-30 | End: 2021-12-13 | Stop reason: SDUPTHER

## 2020-11-30 ASSESSMENT — PATIENT HEALTH QUESTIONNAIRE - PHQ9
SUM OF ALL RESPONSES TO PHQ9 QUESTIONS 1 & 2: 0
SUM OF ALL RESPONSES TO PHQ QUESTIONS 1-9: 0
2. FEELING DOWN, DEPRESSED OR HOPELESS: 0
1. LITTLE INTEREST OR PLEASURE IN DOING THINGS: 0

## 2020-11-30 ASSESSMENT — LIFESTYLE VARIABLES: HOW OFTEN DO YOU HAVE A DRINK CONTAINING ALCOHOL: 0

## 2020-11-30 NOTE — PROGRESS NOTES
Medicare Annual Wellness Visit  Name: Niyah Vasquez Date: 2020   MRN: [id number] Sex: Male   Age: 76 y.o. Ethnicity: Non-/Non    : 1945 Race: Felipe Tinoco is here for Medicare AWV    Screenings for behavioral, psychosocial and functional/safety risks, and cognitive dysfunction are all negative except as indicated below. These results, as well as other patient data from the [address] form, are documented in Flowsheets linked to this Encounter. CAD: no cp or palp's. No sob, wheeze, sob, leg pain, myalgias. No Known Allergies      Prior to Visit Medications    Medication Sig Taking? Authorizing Provider   simvastatin (ZOCOR) 80 MG tablet Take 1 tablet by mouth nightly Yes Kenny Tyler DO   tamsulosin (FLOMAX) 0.4 MG capsule Take 0.4 mg by mouth daily Yes Historical Provider, MD   gabapentin (NEURONTIN) 800 MG tablet Take 800 mg by mouth 3 times daily. Yes Historical Provider, MD   aspirin 81 MG chewable tablet Take 1 tablet by mouth daily. Yes Alivia Oviedo DO   fluticasone (FLONASE) 50 MCG/ACT nasal spray 1 spray by Nasal route daily. Yes Historical Provider, MD         Past Medical History:   Diagnosis Date    CAD (coronary artery disease)     Dr. Malik Brunson Chronic back pain     Colon polyp     Kidney stones     Sciatica     Sinusitis     TIA (transient ischemic attack)     Unspecified cerebral artery occlusion with cerebral infarction 2013    TIA- no deficits        Past Surgical History:   Procedure Laterality Date    COLONOSCOPY  2015    COLONOSCOPY  2018    [facility]    CORONARY ARTERY BYPASS GRAFT  11/01/2013    x 4  Dr. Francheska Colvin ENDOSCOPY, COLON, DIAGNOSTIC      EPIDURAL STEROID INJECTION N/A 2019    L5-S1 EPIDURAL STEROID INJECTION [facility]?     INGUINAL HERNIA REPAIR Right 2018    Milosevic    LUMBAR SPINE SURGERY  2019 decompression L3-L5 [place] at [address]         History reviewed. No pertinent family history. CareTeam (Including outside providers/suppliers regularly involved in providing care):   Patient Care Team:  Quinton Zhao DO as PCP - General (Family Medicine)  Quinton [facility] DO [facility]  Susanne Downing MD as Consulting Physician (Cardiology)  Andrew Saldivar DO as Surgeon (Cardiothoracic Surgery)  Vinnie Kruse DO as Consulting Physician (Pain Medicine)    Wt Readings from Last 3 Encounters:   11/30/20 185 lb (83.9 kg)   09/15/20 180 lb 11.2 oz (82 kg)   06/08/20 177 lb (80.3 kg)     Vitals:    11/30/20 0942   BP: 122/82   Pulse: 68   Temp: 97.8 °F (36.6 °C)   SpO2: 98%   Weight: 185 lb (83.9 kg)   Height: 6' (1.829 m)     Body mass index is 25.09 kg/m². Based upon direct observation of the patient, evaluation of cognition reveals recent and remote memory intact.     General Appearance: alert and oriented to person, place and time, well developed and well- nourished, in no acute distress  Skin: warm and dry, no rash or erythema  Head: normocephalic and atraumatic  Eyes: pupils equal, round, and reactive to light, extraocular eye movements intact, conjunctivae normal  ENT: tympanic membrane, external ear and ear canal normal bilaterally, nose without deformity, nasal mucosa and turbinates normal without polyps  Neck: supple and non-tender without mass, no thyromegaly or thyroid nodules, no cervical lymphadenopathy  Pulmonary/Chest: clear to auscultation bilaterally- no wheezes, rales or rhonchi, normal air movement, no respiratory distress  Cardiovascular: normal rate, regular rhythm, normal S1 and S2, no murmurs, rubs, clicks, or gallops, distal pulses intact, no carotid bruits  Abdomen: soft, non-tender, non-distended, normal bowel sounds, no masses or organomegaly  Extremities: no cyanosis, clubbing or edema  Musculoskeletal: normal range of motion, no joint swelling, deformity or tenderness  Neurologic: reflexes normal and symmetric, no cranial nerve deficit, gait, coordination and speech normal    Patient's complete Health Risk Assessment and screening values have been reviewed and are found in Flowsheets. The following problems were reviewed today and where indicated follow up appointments were made and/or referrals ordered. Positive Risk Factor Screenings with Interventions:       General Health and ACP:  General  In general, how would you say your health is?: Good  In the past 7 days, have you experienced any of the following?  New or Increased Pain, New or Increased Fatigue, Loneliness, Social Isolation, Stress or Anger?: None of These  Do you get the social and emotional support that you need?: Yes  Do you have a Living Will?: Yes  Advance Directives     Power of  Living Will ACP-Advance Directive ACP-Power of     Not on File Not on File [id number] [facility] Risk Interventions:  · -    Hearing/Vision:  No exam data present  Hearing/Vision  Do you or your family notice any trouble with your hearing?: (!) Yes  Do you have difficulty driving, watching TV, or doing any of your daily activities because of your eyesight?: No  Have you had an eye exam within the past year?: (!) No  Hearing/Vision Interventions:  · Hearing concerns:  patient declines any further evaluation/treatment for hearing issues  · Vision concerns:  patient declines any further evaluation/treatment for this issue    Personalized Preventive Plan   Current Health Maintenance Status  Immunization History   Administered Date(s) Administered    Influenza Vaccine, unspecified formulation 01/08/2015    Influenza, High Dose (Fluzone 65 yrs and older) 09/10/2018, 09/19/2019, 09/08/2020    Pneumococcal Conjugate 13-valent ([id number]) 12/07/2015    Pneumococcal Polysaccharide (Lxxssonyd23) 12/14/2016    Zoster Live (Zostavax) 06/15/2015        Health Maintenance   Topic Date Due    Hepatitis C screen  1945    DTaP/Tdap/Td vaccine (1 - Tdap) 08/12/1964    Shingles Vaccine (2 of 3) 08/10/2015    Annual Wellness Visit (AWV)  06/08/2020    Lipid screen  06/08/2021    Potassium monitoring  06/08/2021    Creatinine monitoring  06/08/2021    Colon cancer screen colonoscopy  09/05/2021    Flu vaccine  Completed    Pneumococcal 65+ years Vaccine  Completed    AAA screen  Completed    Hepatitis A vaccine  Aged Out    Hepatitis B vaccine  Aged Out    Hib vaccine  Aged Out    Meningococcal (ACWY) vaccine  Aged Out     Recommendations for BackOps Due: see orders and patient instructions/AVS.  . Recommended screening schedule for the next 5-10 years is provided to the patient in written form: see Patient Instructions/AVS.    Sheng Burns was seen today for medicare awv. Diagnoses and all orders for this visit:    Routine general medical examination at a health care facility    Coronary artery disease involving native heart without angina pectoris, unspecified vessel or lesion type  -     simvastatin (ZOCOR) 80 MG tablet; Take 1 tablet by mouth nightly  -     Lipid Panel; Future  -     Comprehensive Metabolic Panel; Future    Screening PSA (prostate specific antigen)  -     Psa screening; Future        - consider Zetia again if ldl >70.  - follow up with urology.

## 2020-11-30 NOTE — PATIENT INSTRUCTIONS
Personalized Preventive Plan for Elisa Nina - 11/30/2020  Medicare offers a range of preventive health benefits. Some of the tests and screenings are paid in full while other may be subject to a deductible, co-insurance, and/or copay. Some of these benefits include a comprehensive review of your medical history including lifestyle, illnesses that may run in your family, and various assessments and screenings as appropriate. After reviewing your medical record and screening and assessments performed today your provider may have ordered immunizations, labs, imaging, and/or referrals for you. A list of these orders (if applicable) as well as your Preventive Care list are included within your After Visit Summary for your review. Other Preventive Recommendations:    · A preventive eye exam performed by an eye specialist is recommended every 1-2 years to screen for glaucoma; cataracts, macular degeneration, and other eye disorders. · A preventive dental visit is recommended every 6 months. · Try to get at least 150 minutes of exercise per week or 10,000 steps per day on a pedometer . · Order or download the FREE \"Exercise & Physical Activity: Your Everyday Guide\" from [employer]. Call 8-651.350.7631 or search [employer] online. · You need 7732-5287 mg of calcium and 5380-5423 IU of vitamin D per day. It is possible to meet your calcium requirement with diet alone, but a vitamin D supplement is usually necessary to meet this goal.  · When exposed to the sun, use a sunscreen that protects against both UVA and UVB radiation with an SPF of 30 or greater. Reapply every 2 to 3 hours or after sweating, drying off with a towel, or swimming. · Always wear a seat belt when traveling in a car. Always wear a helmet when riding a bicycle or motorcycle.

## 2020-12-01 ENCOUNTER — TELEPHONE (OUTPATIENT)
Dept: PRIMARY CARE CLINIC | Age: 75
End: 2020-12-01

## 2020-12-01 NOTE — TELEPHONE ENCOUNTER
Called patient discussed labs. LDL at 72 it should be under 70. He refuses any additional medicine to lower this. We will continue current medication and diet. He has an appointment tomorrow with the neurosurgeon at [facility] for evaluation.

## 2020-12-02 ENCOUNTER — INITIAL CONSULT (OUTPATIENT)
Dept: NEUROSURGERY | Age: 75
End: 2020-12-02
Payer: MEDICARE

## 2020-12-02 VITALS
TEMPERATURE: 97.5 F | BODY MASS INDEX: 24.65 KG/M2 | HEART RATE: 76 BPM | WEIGHT: 182 LBS | SYSTOLIC BLOOD PRESSURE: 123 MMHG | DIASTOLIC BLOOD PRESSURE: 72 MMHG | HEIGHT: 72 IN

## 2020-12-02 PROCEDURE — 3017F COLORECTAL CA SCREEN DOC REV: CPT | Performed by: PHYSICIAN ASSISTANT

## 2020-12-02 PROCEDURE — 1123F ACP DISCUSS/DSCN MKR DOCD: CPT | Performed by: PHYSICIAN ASSISTANT

## 2020-12-02 PROCEDURE — G8420 CALC BMI NORM PARAMETERS: HCPCS | Performed by: PHYSICIAN ASSISTANT

## 2020-12-02 PROCEDURE — G8482 FLU IMMUNIZE ORDER/ADMIN: HCPCS | Performed by: PHYSICIAN ASSISTANT

## 2020-12-02 PROCEDURE — 1036F TOBACCO NON-USER: CPT | Performed by: PHYSICIAN ASSISTANT

## 2020-12-02 PROCEDURE — G8427 DOCREV CUR MEDS BY ELIG CLIN: HCPCS | Performed by: PHYSICIAN ASSISTANT

## 2020-12-02 PROCEDURE — 4040F PNEUMOC VAC/ADMIN/RCVD: CPT | Performed by: PHYSICIAN ASSISTANT

## 2020-12-02 PROCEDURE — 99203 OFFICE O/P NEW LOW 30 MIN: CPT | Performed by: PHYSICIAN ASSISTANT

## 2020-12-02 ASSESSMENT — ENCOUNTER SYMPTOMS
RESPIRATORY NEGATIVE: 1
ALLERGIC/IMMUNOLOGIC NEGATIVE: 1
GASTROINTESTINAL NEGATIVE: 1
EYES NEGATIVE: 1

## 2020-12-02 NOTE — PROGRESS NOTES
Subjective:      Patient ID: Mirela Swift is a 76 y.o. male. Leg Pain    Incident onset: 15 months. There was no injury mechanism. Pain location: right buttock that radiates into the foot. The quality of the pain is described as aching. The pain is at a severity of 8/10. The pain has been constant since onset. The symptoms are aggravated by weight bearing. Treatments tried: prior lumbar L2-5 laminectomym YA, PT, gabapentin. The treatment provided mild relief. Review of Systems   Constitutional: Negative. HENT: Negative. Eyes: Negative. Respiratory: Negative. Cardiovascular: Negative. Gastrointestinal: Negative. Endocrine: Negative. Genitourinary: Negative. Musculoskeletal: Negative. Skin: Negative. Allergic/Immunologic: Negative. Neurological: Negative. Hematological: Negative. Psychiatric/Behavioral: Negative. Objective:   Physical Exam  Constitutional:       Appearance: Normal appearance. HENT:      Head: Normocephalic and atraumatic. Eyes:      Pupils: Pupils are equal, round, and reactive to light. Pulmonary:      Effort: Pulmonary effort is normal.   Abdominal:      General: There is no distension. Musculoskeletal: Normal range of motion. Skin:     General: Skin is warm and dry. Neurological:      Mental Status: He is alert. GCS: GCS eye subscore is 4. GCS verbal subscore is 5. GCS motor subscore is 6. Cranial Nerves: Cranial nerves are intact. Sensory: Sensory deficit present. Motor: Motor function is intact. Gait: Gait is intact. Deep Tendon Reflexes:      Reflex Scores:       Patellar reflexes are 1+ on the right side and 1+ on the left side. Achilles reflexes are 1+ on the right side and 1+ on the left side. Comments: Decreased sensation to light touch in right L5-S1 distributions.    Psychiatric:         Mood and Affect: Mood normal.         Assessment:      76year old male with right radicular leg pain for 15 months. He had a prior L2-5 laminectomy 16 months ago in [facility]. Lumbar MRI reveals L2-5 laminectomy defect, normal alignment, moderate bialteral L3-4 and L4-5 foraminal stenosis, no central stenosis. + clumping of nerve roots suggestive of arachnoiditis. Plan: We will repeat the MRI and order an EMG.   If unimpressive, he may benefit from a SCS trial.        Fisher Pulse, PA

## 2020-12-15 ENCOUNTER — TELEPHONE (OUTPATIENT)
Dept: PRIMARY CARE CLINIC | Age: 75
End: 2020-12-15

## 2020-12-15 NOTE — TELEPHONE ENCOUNTER
Pt was getting bad muscle aches so he stopped his statin and it improved tried to take the statin again and muscle aches returned.

## 2020-12-16 ENCOUNTER — HOSPITAL ENCOUNTER (OUTPATIENT)
Dept: MRI IMAGING | Age: 75
Discharge: HOME | End: 2020-12-18
Payer: MEDICARE

## 2020-12-16 ENCOUNTER — HOSPITAL ENCOUNTER (OUTPATIENT)
Age: 75
Discharge: HOME | End: 2020-12-18
Payer: MEDICARE

## 2020-12-16 PROCEDURE — 6360000004 HC RX CONTRAST MEDICATION: Performed by: RADIOLOGY

## 2020-12-16 PROCEDURE — 2580000003 HC RX 258: Performed by: RADIOLOGY

## 2020-12-16 PROCEDURE — 72158 MRI LUMBAR SPINE W/O & W/DYE: CPT

## 2020-12-16 PROCEDURE — A9579 GAD-BASE MR CONTRAST NOS,1ML: HCPCS | Performed by: RADIOLOGY

## 2020-12-16 RX ORDER — SODIUM CHLORIDE 0.9 % (FLUSH) 0.9 %
10 SYRINGE (ML) INJECTION 2 TIMES DAILY
Status: DISCONTINUED | OUTPATIENT
Start: 2020-12-16 | End: 2020-12-19 | Stop reason: HOSPADM

## 2020-12-16 RX ADMIN — Medication 10 ML: at 16:15

## 2020-12-16 RX ADMIN — GADOTERIDOL 17 ML: 279.3 INJECTION, SOLUTION INTRAVENOUS at 16:15

## 2020-12-17 ENCOUNTER — TELEPHONE (OUTPATIENT)
Dept: NEUROSURGERY | Age: 75
End: 2020-12-17

## 2020-12-21 ENCOUNTER — HOSPITAL ENCOUNTER (OUTPATIENT)
Dept: NEUROLOGY | Age: 75
Discharge: HOME | End: 2020-12-21
Payer: MEDICARE

## 2020-12-21 VITALS — BODY MASS INDEX: 24.65 KG/M2 | WEIGHT: 182 LBS | HEIGHT: 72 IN

## 2020-12-21 PROCEDURE — 95886 MUSC TEST DONE W/N TEST COMP: CPT

## 2020-12-21 PROCEDURE — 95911 NRV CNDJ TEST 9-10 STUDIES: CPT

## 2020-12-21 NOTE — PROCEDURES
EMG Anup 1978   Electrodiagnostic Laboratory  [facility]        Full Name: Yandy Goodson Gender: Male  MRN: [id number] YOB: 1945  Location[de-identified] [facility] (09)      Visit Date: 12/21/2020 13:12  Age: 76 Years 3 Months Old  Examining Physician: Dr. Greg Vogt   Referring Physician: Aviva Cohen  Technician: Isrrael Dimas   Height: 6 feet 0 inch  Weight: 182 lbs  Notes: Lumbar Radiculopathy M54.16    BMI: 24.7      Motor NCS      Nerve / Sites Lat. Amplitude Distance Lat Diff Velocity Temp. Amp. 1-2    ms mV cm ms m/s °C %   R Peroneal - EDB      Ankle 5.36 1.2 8   31.9 100      Pop fossa 15.21 0.7 39 9.84 40 31.9 63.5   L Peroneal - EDB      Ankle 4.84 0.9 8   31.5 100      Pop fossa 15.26 0.8 39 10.42 37 31.3 92   R Tibial - AH      Ankle 3.18 1.0 8   31.6 100      Pop fossa 15.83 1.4 42 12.66 33 31.6 148       Sensory NCS      Nerve / Sites Onset Lat Peak Lat PP Amp Distance Velocity Temp.    ms ms µV cm m/s °C   R Superficial peroneal - Ankle      Lat leg 2.71 3.33 1.5 10 37 31.7   L Superficial peroneal - Ankle      Lat leg NR NR NR 10 NR 31.3   R Sural - Ankle (Calf)      Calf 1.61 1.93 5.1 14 87 31.6   L Sural - Ankle (Calf)      Calf 3.49 4.38 5.1 14 40 31.3       F  Wave      Nerve F Lat M Lat F-M Lat    ms ms ms   R Peroneal - EDB 59.3 5.5 53.8   R Tibial - AH 62.0 4.8 57.2   L Peroneal - EDB 59.4 4.4 55.1       H Reflex      Nerve Lat Hmax    ms   R Tibial - Soleus 31.6   L Tibial - Soleus 34. 9       EMG         EMG Summary Table     Spontaneous MUAP Recruitment   Muscle IA Fib PSW Fasc H.F. Amp Dur. PPP Pattern   L. Lumbar paraspinals (low) N None None None Serrated N N 2+ N   R. Lumbar paraspinals (low) N None None None Serrated N N 2+ N   L. Vastus medialis N None None None Serrated N 1+ 1+ N   R. Vastus medialis N None None None Serrated N 1+ 1+ N   L. Gastrocnemius (Medial head) N None None None None N N N N   R.  Gastrocnemius (Medial head) N None None None None N N N N   L. Tibialis anterior N None None None None N N N N   R. Tibialis anterior N None None None None N N N N   L. Flexor digitorum longus N None None None None N N N N   R. Flexor digitorum longus N None None None None N N N N   L. Extensor hallucis longus N None None None Serrated 1+ 1+ 1+ N   R. Extensor hallucis longus N None None None Serrated 1+ 1+ 1+ N         This is the first electrodiagnostic study for Garry Jones. He has been advised as to the indications, contraindications, benefits and risks of this examination. He voices understanding and consent. This study is abnormal.      Summary:  Nerve conduction studies in the lower extremities reveal normal mixed motor latency of the peroneal and tibial nerves studied in the lower extremities. However, amplitudes of right peroneal, left peroneal, and right tibial are reduced. Velocity of the right peroneal is normal, reduction of the left peroneal and right tibial.  Temperature measured is normal..    Sensory studies in the lower extremities reveal no response to attempted stimulation of the left superficial peroneal.  Peak latency of the right superficial peroneal is normal.  Amplitude however is reduced. Sural peak latencies are within normal limits. F responses prolonged for peroneal nerve bilaterally, right tibial.  H reflex prolonged left tibial.    Insertional activity in the lower extremity revealing changes diffusely in the lower extremities particularly below the knee in the form of serrated potentials, polyphasic units, increased amplitude and duration. These same changes were present in the lower lumbar paraspinals. Please see the above summarization chart for insertional activity. However, there was no noted fibrillation potentials or positive waves noted in this examination. There are absence particularly of fibrillations reflex absent of acute pathology. .    Impression:  Study compatible with the following:    #1 evidence suggesting peripheral neuropathic changes particularly in motor fibers, axonal as well as mylin units. #2 no response to stimulation of the left superficial peroneal nerve. #3 normal conduction of sensory fibers right superficial peroneal, and sural bilaterally. #4 no evidence of acute pathology in the form of fibrillation potentials affecting nerve roots from L3-L4-L5 and S1 levels bilaterally. Recommendation: Clinical correlation with imaging. Patient is referred back to neurosurgical specialist..    Clinical correlation recommended.         Electronically signed by Delvis Montes De Oca MD on [id number]/8648 at 2:38 PM

## 2020-12-21 NOTE — PROGRESS NOTES
Outpatient EMG/NCV study completed. Report will be forwarded upon completion.    [employer]

## 2020-12-21 NOTE — LETTER
RE:  Margarite Lanes    Dear Dr. Raisa Temple,     Enclosed you will find a copy of the requested EMG on your patient, Margarite Lanes completed 12/21/2020. EMG Findings:         [address]   Electrodiagnostic Laboratory  Cate Brown MD   Physician   Internal Medicine   Procedures   Signed   Date of Service:  12/21/2020  1:00 PM            Procedure Orders   EMG [[id number]] ordered by TON Arechiga at 12/02/20 1041   Pre-procedure Diagnoses   Lumbar radiculopathy [M54.16]   Procedures   EMG [NEU5]          Signed             Show:Clear all  [x]Manual[x]Template[x]Copied    Added by:  Marlon Freed MD    []Akashver for details  EMG [facility]   Electrodiagnostic Laboratory  [place]         Full Name:    Floyd Aiken                      Gender:             Male  MRN:             [id number]                              YOB: 1945  Location<[facility]>     [facility] (92)      Visit Date:                          12/21/2020 13:12  Age:                                   76 Years 3 Months Old  Examining Physician:      Dr. Ruben Araiza   Referring Physician:        ABRAN Wu [place]  Technician:                       Juany Villagran   Height:                               6 feet 0 inch  Weight:                              182 lbs  Notes:                                Lumbar Radiculopathy M54.16     BMI: 24.7      Motor NCS      Nerve / Sites Lat. Amplitude Distance Lat Diff Velocity Temp. Amp. 1-2     ms mV cm ms m/s °C %   R Peroneal - EDB      Ankle 5.36 1.2 8     31.9 100      Pop fossa 15.21 0.7 39 9.84 40 31.9 63.5   L Peroneal - EDB      Ankle 4.84 0.9 8     31.5 100      Pop fossa 15.26 0.8 39 10.42 37 31.3 92   R Tibial - AH      Ankle 3.18 1.0 8     31.6 100      Pop fossa 15.83 1.4 42 12.66 33 31.6 148       Sensory NCS      Nerve / Sites Onset Lat Peak Lat PP Amp Distance Velocity Temp.   ms ms µV cm m/s °C   R Superficial peroneal - Ankle      Lat leg 2.71 3.33 1.5 10 37 31.7   L Superficial peroneal - Ankle      Lat leg NR NR NR 10 NR 31.3   R Sural - Ankle (Calf)      Calf 1.61 1.93 5.1 14 87 31.6   L Sural - Ankle (Calf)      Calf 3.49 4.38 5.1 14 40 31.3       F  Wave      Nerve F Lat M Lat F-M Lat     ms ms ms   R Peroneal - EDB 59.3 5.5 53.8   R Tibial - AH 62.0 4.8 57.2   L Peroneal - EDB 59.4 4.4 55.1       H Reflex      Nerve Lat Hmax     ms   R Tibial - Soleus 31.6   L Tibial - Soleus 34. 9       EMG                     EMG Summary Table       Spontaneous MUAP Recruitment   Muscle IA Fib PSW Fasc H.F. Amp Dur. PPP Pattern   L. Lumbar paraspinals (low) N None None None Serrated N N 2+ N   R. Lumbar paraspinals (low) N None None None Serrated N N 2+ N   L. Vastus medialis N None None None Serrated N 1+ 1+ N   R. Vastus medialis N None None None Serrated N 1+ 1+ N   L. Gastrocnemius (Medial head) N None None None None N N N N   R. Gastrocnemius (Medial head) N None None None None N N N N   L. Tibialis anterior N None None None None N N N N   R. Tibialis anterior N None None None None N N N N   L. Flexor digitorum longus N None None None None N N N N   R. Flexor digitorum longus N None None None None N N N N   L. Extensor hallucis longus N None None None Serrated 1+ 1+ 1+ N   R. Extensor hallucis longus N None None None Serrated 1+ 1+ 1+ N          This is the first electrodiagnostic study for Alondra Valenzuela. He has been advised as to the indications, contraindications, benefits and risks of this examination. He voices understanding and consent.   This study is abnormal.       Summary:  Nerve conduction studies in the lower extremities reveal normal mixed motor latency of the peroneal and tibial nerves studied in the lower extremities. However, amplitudes of right peroneal, left peroneal, and right tibial are reduced. Velocity of the right peroneal is normal, reduction of the left peroneal and right tibial.  Temperature measured is normal..     Sensory studies in the lower extremities reveal no response to attempted stimulation of the left superficial peroneal.  Peak latency of the right superficial peroneal is normal.  Amplitude however is reduced. Sural peak latencies are within normal limits.     F responses prolonged for peroneal nerve bilaterally, right tibial.  H reflex prolonged left tibial.     Insertional activity in the lower extremity revealing changes diffusely in the lower extremities particularly below the knee in the form of serrated potentials, polyphasic units, increased amplitude and duration. These same changes were present in the lower lumbar paraspinals. Please see the above summarization chart for insertional activity. However, there was no noted fibrillation potentials or positive waves noted in this examination. There are absence particularly of fibrillations reflex absent of acute pathology. .     Impression:  Study compatible with the following:     #1 evidence suggesting peripheral neuropathic changes particularly in motor fibers, axonal as well as mylin units.     #2 no response to stimulation of the left superficial peroneal nerve.     #3 normal conduction of sensory fibers right superficial peroneal, and sural bilaterally.     #4 no evidence of acute pathology in the form of fibrillation potentials affecting nerve roots from L3-L4-L5 and S1 levels bilaterally.     Recommendation: Clinical correlation with imaging.   Patient is referred back to neurosurgical specialist..     Clinical correlation recommended.          aspirin 81 MG chewable tablet Take 1 tablet by mouth daily. 13   Seth Santos DO   fluticasone (FLONASE) 50 MCG/ACT nasal spray 1 spray by Nasal route daily. Historical Provider, MD       PMH:     Past Medical History:   Diagnosis Date    CAD (coronary artery disease)     Dr. Mahnaz Banks Chronic back pain     Colon polyp     Kidney stones     Sciatica     Sinusitis     TIA (transient ischemic attack)     Unspecified cerebral artery occlusion with cerebral infarction 2013    TIA- no deficits        PSH:    Past Surgical History:   Procedure Laterality Date    COLONOSCOPY  2015    COLONOSCOPY  2018    [facility]    CORONARY ARTERY BYPASS GRAFT  11/01/2013    x 4  Dr. Radha Bueno ENDOSCOPY, COLON, DIAGNOSTIC      EPIDURAL STEROID INJECTION N/A 2019    L5-S1 EPIDURAL STEROID INJECTION performed by Ernestina Everett DO at [facility]?     INGUINAL HERNIA REPAIR Right 2018    [facility]osevic    LUMBAR SPINE SURGERY  2019    decompression L3-L5 [place] [facility]    TONSILLECTOMY         Social History:    Social History     Socioeconomic History    Marital status:      Spouse name: Not on file    Number of children: Not on file    Years of education: Not on file    Highest education level: Not on file   Occupational History    Not on file   Social Needs    Financial resource strain: Not on file    Food insecurity     Worry: Not on file     Inability: Not on file   [employer] needs     Medical: Not on file     Non-medical: Not on file   Tobacco Use    Smoking status: Former Smoker     Packs/day: 1.00     Years: 30.00     Pack years: 30.00     Quit date: 1966     Years since quittin.1    Smokeless tobacco: Never Used   Substance and Sexual Activity    Alcohol use: Not Currently     Alcohol/week: 0.0 standard drinks    Drug use: No    Sexual activity: Not on file   Lifestyle    Physical activity Days per week: Not on file     Minutes per session: Not on file    Stress: Not on file   Relationships    Social connections     Talks on phone: Not on file     Gets together: Not on file     Attends Islam service: Not on file     Active member of club or organization: Not on file     Attends meetings of clubs or organizations: Not on file     Relationship status: Not on file    Intimate partner violence     Fear of current or ex partner: Not on file     Emotionally abused: Not on file     Physically abused: Not on file     Forced sexual activity: Not on file   Other Topics Concern    Not on file   Social History Narrative    Not on file       Family History:  No family history on file. Exam: Reveals a 80-year-old male 6 foot weighing 181 pounds. Cognitively intact and following commands. Skin: Get in the lower extremity is normal in color, temperature, and texture. No lesions are present. Motor examination of the lower extremities demonstrates no significant weakness, normal tone, and no atrophy. .    Sensory examination is intact to touch in the lower extremities. .     Reflexes symmetrical in the lower extremities pathologic reflexes such as clonus or Babinski. .     Range of motion of the hip passively demonstrates tightness. Negative straight leg raising. Discussion: Electromyographic studies have been mentioned above. There is no evidence of an acute problem demonstrating new compromise of the lumbosacral nerve fibers. There is evidence of chronic changes that have been present affecting lower extremity nerve fibers. Patient is being referred back to his list to discuss options for controlling and managing pain and function. If there are any questions, please contact me for discussion. Thank you once again for allowing me to participate along with you in his behalf.             Electronically signed by Reg Bryson MD on [id number]/39/9958 at 2:23 PM   RE:  Yanique Lloyd

## 2021-06-03 ENCOUNTER — TELEPHONE (OUTPATIENT)
Dept: ADMINISTRATIVE | Age: 76
End: 2021-06-03

## 2021-06-08 ENCOUNTER — OFFICE VISIT (OUTPATIENT)
Dept: FAMILY MEDICINE CLINIC | Age: 76
End: 2021-06-08
Payer: MEDICARE

## 2021-06-08 VITALS
BODY MASS INDEX: 24.79 KG/M2 | SYSTOLIC BLOOD PRESSURE: 132 MMHG | OXYGEN SATURATION: 97 % | TEMPERATURE: 97.8 F | DIASTOLIC BLOOD PRESSURE: 76 MMHG | HEART RATE: 101 BPM | RESPIRATION RATE: 18 BRPM | HEIGHT: 72 IN | WEIGHT: 183 LBS

## 2021-06-08 DIAGNOSIS — R68.83 CHILLS: ICD-10-CM

## 2021-06-08 DIAGNOSIS — R31.9 URINARY TRACT INFECTION WITH HEMATURIA, SITE UNSPECIFIED: Primary | ICD-10-CM

## 2021-06-08 DIAGNOSIS — N39.0 URINARY TRACT INFECTION WITH HEMATURIA, SITE UNSPECIFIED: Primary | ICD-10-CM

## 2021-06-08 LAB
BILIRUBIN, POC: NEGATIVE
BLOOD URINE, POC: NORMAL
CLARITY, POC: CLEAR
COLOR, POC: YELLOW
GLUCOSE URINE, POC: NEGATIVE
INFLUENZA A ANTIBODY: NEGATIVE
INFLUENZA B ANTIBODY: NEGATIVE
KETONES, POC: NEGATIVE
LEUKOCYTE EST, POC: NORMAL
Lab: NORMAL
NITRITE, POC: NEGATIVE
PERFORMING INSTRUMENT: NORMAL
PH, POC: 7
PROTEIN, POC: NORMAL
QC PASS/FAIL: NORMAL
SARS-COV-2, POC: NORMAL
SPECIFIC GRAVITY, POC: 1.02
UROBILINOGEN, POC: 2

## 2021-06-08 PROCEDURE — 4040F PNEUMOC VAC/ADMIN/RCVD: CPT | Performed by: PHYSICIAN ASSISTANT

## 2021-06-08 PROCEDURE — 3017F COLORECTAL CA SCREEN DOC REV: CPT | Performed by: PHYSICIAN ASSISTANT

## 2021-06-08 PROCEDURE — G8420 CALC BMI NORM PARAMETERS: HCPCS | Performed by: PHYSICIAN ASSISTANT

## 2021-06-08 PROCEDURE — G8427 DOCREV CUR MEDS BY ELIG CLIN: HCPCS | Performed by: PHYSICIAN ASSISTANT

## 2021-06-08 PROCEDURE — 1123F ACP DISCUSS/DSCN MKR DOCD: CPT | Performed by: PHYSICIAN ASSISTANT

## 2021-06-08 PROCEDURE — 87426 SARSCOV CORONAVIRUS AG IA: CPT | Performed by: PHYSICIAN ASSISTANT

## 2021-06-08 PROCEDURE — 81002 URINALYSIS NONAUTO W/O SCOPE: CPT | Performed by: PHYSICIAN ASSISTANT

## 2021-06-08 PROCEDURE — 99214 OFFICE O/P EST MOD 30 MIN: CPT | Performed by: PHYSICIAN ASSISTANT

## 2021-06-08 PROCEDURE — 87804 INFLUENZA ASSAY W/OPTIC: CPT | Performed by: PHYSICIAN ASSISTANT

## 2021-06-08 PROCEDURE — 1036F TOBACCO NON-USER: CPT | Performed by: PHYSICIAN ASSISTANT

## 2021-06-08 RX ORDER — CEFDINIR 300 MG/1
300 CAPSULE ORAL 2 TIMES DAILY
Qty: 20 CAPSULE | Refills: 0 | Status: SHIPPED
Start: 2021-06-08 | End: 2021-06-11

## 2021-06-08 NOTE — PROGRESS NOTES
Surgical History:   Procedure Laterality Date    COLONOSCOPY  2015    COLONOSCOPY  2018    [facility]vic    CORONARY ARTERY BYPASS GRAFT  11/01/2013    x 4  Dr. Graham Dasilva ENDOSCOPY, COLON, DIAGNOSTIC      EPIDURAL STEROID INJECTION N/A 2019    L5-S1 EPIDURAL STEROID INJECTION performed by Micky Doyle DO at [facility]?  INGUINAL HERNIA REPAIR Right 2018    [facility]    LUMBAR SPINE SURGERY  2019    decompression L3-L5 [place] at [address]         History reviewed. No pertinent family history. Medications:     Current Outpatient Medications:     cefdinir (OMNICEF) 300 MG capsule, Take 1 capsule by mouth 2 times daily for 10 days, Disp: 20 capsule, Rfl: 0    simvastatin (ZOCOR) 80 MG tablet, Take 1 tablet by mouth nightly, Disp: 90 tablet, Rfl: 2    tamsulosin (FLOMAX) 0.4 MG capsule, Take 0.4 mg by mouth daily, Disp: , Rfl:     gabapentin (NEURONTIN) 800 MG tablet, Take 800 mg by mouth 3 times daily. , Disp: , Rfl:     aspirin 81 MG chewable tablet, Take 1 tablet by mouth daily. , Disp: 30 tablet, Rfl: 3    fluticasone (FLONASE) 50 MCG/ACT nasal spray, 1 spray by Nasal route daily. , Disp: , Rfl:     Allergies:   No Known Allergies    Social History:     Social History     Tobacco Use    Smoking status: Former Smoker     Packs/day: 1.00     Years: 30.00     Pack years: 30.00     Quit date: 1966     Years since quittin.5    Smokeless tobacco: Never Used   Vaping Use    Vaping Use: Never used   Substance Use Topics    Alcohol use: Not Currently     Alcohol/week: 0.0 standard drinks    Drug use: No       Patient lives at home. Physical Exam:     Vitals:    21 1139   BP: 132/76   Pulse: 101   Resp: 18   Temp: 97.8 °F (36.6 °C)   SpO2: 97%   Weight: 183 lb (83 kg)   Height: 6' (1.829 m)       Exam:  Physical Exam  Nurse's notes and vital signs reviewed. The patient is not hypoxic.   General: Alert, no acute distress, patient resting comfortably Patient is not toxic or lethargic. Skin: Warm, intact, no pallor noted. There is no evidence of rash at this time. Head: Normocephalic, atraumatic. Eye: Normal conjunctiva  Ears, Nose, Throat: Right tympanic membrane clear, left tympanic membrane clear. No drainage or discharge noted. No pre- or post-auricular tenderness, erythema, or swelling noted. No rhinorrhea or congestion noted. No facial erythema. Posterior oropharynx shows no erythema, tonsillar hypertrophy, asymmetry or peritonsillar abscess and no evidence of exudate. the uvula is midline. No trismus or drooling is noted. Moist mucous membranes. Neck: No anterior/posterior lymphadenopathy noted. No erythema, no masses, no fluctuance or induration noted. No meningeal signs. Cardio: Regular Rate and Rhythm  Respiratory: No acute distress, no rhonchi, wheezing or crackles noted. No stridor or retractions are noted. Abdomen: Normal bowel sounds, soft, nontender, no masses detected. No rebound, guarding, or rigidity noted. Musculoskeletal: No obvious deformity, no edema, no calf tenderness. No erythema.   Neurological: A&O x4, normal speech  Psychiatric: Cooperative         Testing:     Results for orders placed or performed in visit on 06/08/21   POCT COVID-19, Antigen   Result Value Ref Range    SARS-COV-2, POC Not-Detected Not Detected    Lot Number [id number]     QC Pass/Fail pass     Performing Instrument BD Veritor    POCT Influenza A/B   Result Value Ref Range    Influenza A Ab negative     Influenza B Ab negative    POCT Urinalysis no Micro   Result Value Ref Range    Color, UA yellow     Clarity, UA clear     Glucose, UA POC negative     Bilirubin, UA negative     Ketones, UA negative     Spec Grav, UA 1.020     Blood, UA POC trace     pH, UA 7.0     Protein, UA POC trace     Urobilinogen, UA 2.0     Leukocytes, UA small     Nitrite, UA negative            Medical Decision Making:     Vital signs reviewed    Past medical

## 2021-06-11 LAB
ORGANISM: ABNORMAL
URINE CULTURE, ROUTINE: ABNORMAL

## 2021-06-11 RX ORDER — DOXYCYCLINE HYCLATE 100 MG
100 TABLET ORAL 2 TIMES DAILY
Qty: 20 TABLET | Refills: 0 | Status: SHIPPED | OUTPATIENT
Start: 2021-06-11 | End: 2021-06-21

## 2021-06-22 ENCOUNTER — OFFICE VISIT (OUTPATIENT)
Dept: PRIMARY CARE CLINIC | Age: 76
End: 2021-06-22
Payer: MEDICARE

## 2021-06-22 VITALS
WEIGHT: 183 LBS | DIASTOLIC BLOOD PRESSURE: 70 MMHG | SYSTOLIC BLOOD PRESSURE: 130 MMHG | OXYGEN SATURATION: 97 % | BODY MASS INDEX: 24.82 KG/M2 | HEART RATE: 65 BPM | TEMPERATURE: 97.2 F

## 2021-06-22 DIAGNOSIS — I25.119 CORONARY ARTERY DISEASE INVOLVING NATIVE HEART WITH ANGINA PECTORIS, UNSPECIFIED VESSEL OR LESION TYPE (HCC): ICD-10-CM

## 2021-06-22 DIAGNOSIS — M48.062 SPINAL STENOSIS OF LUMBAR REGION WITH NEUROGENIC CLAUDICATION: Primary | ICD-10-CM

## 2021-06-22 PROCEDURE — 4040F PNEUMOC VAC/ADMIN/RCVD: CPT | Performed by: FAMILY MEDICINE

## 2021-06-22 PROCEDURE — G8420 CALC BMI NORM PARAMETERS: HCPCS | Performed by: FAMILY MEDICINE

## 2021-06-22 PROCEDURE — G8427 DOCREV CUR MEDS BY ELIG CLIN: HCPCS | Performed by: FAMILY MEDICINE

## 2021-06-22 PROCEDURE — 99213 OFFICE O/P EST LOW 20 MIN: CPT | Performed by: FAMILY MEDICINE

## 2021-06-22 PROCEDURE — 1123F ACP DISCUSS/DSCN MKR DOCD: CPT | Performed by: FAMILY MEDICINE

## 2021-06-22 PROCEDURE — 1036F TOBACCO NON-USER: CPT | Performed by: FAMILY MEDICINE

## 2021-06-22 PROCEDURE — 3017F COLORECTAL CA SCREEN DOC REV: CPT | Performed by: FAMILY MEDICINE

## 2021-06-22 ASSESSMENT — PATIENT HEALTH QUESTIONNAIRE - PHQ9
SUM OF ALL RESPONSES TO PHQ QUESTIONS 1-9: 0
SUM OF ALL RESPONSES TO PHQ QUESTIONS 1-9: 0
1. LITTLE INTEREST OR PLEASURE IN DOING THINGS: 0
SUM OF ALL RESPONSES TO PHQ9 QUESTIONS 1 & 2: 0
2. FEELING DOWN, DEPRESSED OR HOPELESS: 0
SUM OF ALL RESPONSES TO PHQ QUESTIONS 1-9: 0

## 2021-06-22 ASSESSMENT — ENCOUNTER SYMPTOMS: BACK PAIN: 0

## 2021-06-22 NOTE — PROGRESS NOTES
Grzegorz Land, a male of 76 y.o. came to the office 6/22/2021. Patient Active Problem List   Diagnosis    Chest pain    HTN (hypertension), benign    NSTEMI (non-ST elevated myocardial infarction) ([facility] [address]    TIA (transient ischemic attack)    Hypokalemia    CAD (coronary artery disease)    S/P CABG x 4    Chronic pain syndrome    Lumbar radiculopathy    Spinal stenosis of lumbar region with neurogenic claudication    Chronic bilateral low back pain with bilateral sciatica    Coronary artery disease involving native heart with angina pectoris (HCC)          HPI back pain: taking Neurontin 800 mg 1 in am, 1/2 in afternoon and 1 in pm for 1 wk with no problems. Is to be on 800 mg tid. He wants to see how low of a dose he needs. Not having any pain. Walking ok. CAD: no cp or palp's. Has cardiology appt soon. No Known Allergies    Current Outpatient Medications on File Prior to Visit   Medication Sig Dispense Refill    simvastatin (ZOCOR) 80 MG tablet Take 1 tablet by mouth nightly 90 tablet 2    tamsulosin (FLOMAX) 0.4 MG capsule Take 0.4 mg by mouth daily      gabapentin (NEURONTIN) 800 MG tablet Take 800 mg by mouth 3 times daily.  aspirin 81 MG chewable tablet Take 1 tablet by mouth daily. 30 tablet 3    fluticasone (FLONASE) 50 MCG/ACT nasal spray 1 spray by Nasal route daily. No current facility-administered medications on file prior to visit. Review of Systems   Musculoskeletal: Negative for back pain, gait problem and neck pain. Neurological: Positive for numbness (lateral side of R foot, R scrotum and ischial tuberosity. ). Psychiatric/Behavioral: Negative for sleep disturbance. other review of systems reviewed and are negative    OBJECTIVE:  /70   Pulse 65   Temp 97.2 °F (36.2 °C)   Wt 183 lb (83 kg)   SpO2 97%   BMI 24.82 kg/m²      Physical Exam  Constitutional:       General: He is not in acute distress. Eyes:      General: No scleral icterus.

## 2021-08-31 RX ORDER — GABAPENTIN 800 MG/1
800 TABLET ORAL 3 TIMES DAILY
Qty: 90 TABLET | Refills: 2 | Status: SHIPPED
Start: 2021-08-31 | End: 2021-12-13 | Stop reason: SDUPTHER

## 2021-09-09 DIAGNOSIS — M48.062 SPINAL STENOSIS OF LUMBAR REGION WITH NEUROGENIC CLAUDICATION: Primary | ICD-10-CM

## 2021-09-09 RX ORDER — TRAMADOL HYDROCHLORIDE 50 MG/1
50 TABLET ORAL EVERY 12 HOURS PRN
Qty: 10 TABLET | Refills: 0 | Status: SHIPPED | OUTPATIENT
Start: 2021-09-09 | End: 2021-09-14

## 2021-09-23 ENCOUNTER — OFFICE VISIT (OUTPATIENT)
Dept: FAMILY MEDICINE CLINIC | Age: 76
End: 2021-09-23
Payer: MEDICARE

## 2021-09-23 VITALS
TEMPERATURE: 97.6 F | HEART RATE: 62 BPM | DIASTOLIC BLOOD PRESSURE: 64 MMHG | SYSTOLIC BLOOD PRESSURE: 132 MMHG | BODY MASS INDEX: 25.9 KG/M2 | OXYGEN SATURATION: 97 % | WEIGHT: 191 LBS

## 2021-09-23 DIAGNOSIS — R09.81 NASAL CONGESTION: ICD-10-CM

## 2021-09-23 DIAGNOSIS — H66.92 LEFT OTITIS MEDIA, UNSPECIFIED OTITIS MEDIA TYPE: ICD-10-CM

## 2021-09-23 DIAGNOSIS — J01.90 ACUTE NON-RECURRENT SINUSITIS, UNSPECIFIED LOCATION: Primary | ICD-10-CM

## 2021-09-23 PROCEDURE — 1123F ACP DISCUSS/DSCN MKR DOCD: CPT | Performed by: PHYSICIAN ASSISTANT

## 2021-09-23 PROCEDURE — G8417 CALC BMI ABV UP PARAM F/U: HCPCS | Performed by: PHYSICIAN ASSISTANT

## 2021-09-23 PROCEDURE — G8427 DOCREV CUR MEDS BY ELIG CLIN: HCPCS | Performed by: PHYSICIAN ASSISTANT

## 2021-09-23 PROCEDURE — 1036F TOBACCO NON-USER: CPT | Performed by: PHYSICIAN ASSISTANT

## 2021-09-23 PROCEDURE — 4040F PNEUMOC VAC/ADMIN/RCVD: CPT | Performed by: PHYSICIAN ASSISTANT

## 2021-09-23 PROCEDURE — 99213 OFFICE O/P EST LOW 20 MIN: CPT | Performed by: PHYSICIAN ASSISTANT

## 2021-09-23 RX ORDER — CHLORPHENIRAMINE MALEATE 4 MG/1
4 TABLET ORAL EVERY 6 HOURS PRN
Qty: 20 TABLET | Refills: 0 | Status: SHIPPED
Start: 2021-09-23 | End: 2021-09-29 | Stop reason: ALTCHOICE

## 2021-09-23 RX ORDER — AMOXICILLIN 875 MG/1
875 TABLET, COATED ORAL 2 TIMES DAILY
Qty: 20 TABLET | Refills: 0 | Status: SHIPPED
Start: 2021-09-23 | End: 2021-09-29 | Stop reason: ALTCHOICE

## 2021-09-23 RX ORDER — METHYLPREDNISOLONE 4 MG/1
TABLET ORAL
Qty: 1 KIT | Refills: 0 | Status: SHIPPED
Start: 2021-09-23 | End: 2021-09-29 | Stop reason: ALTCHOICE

## 2021-09-23 NOTE — PROGRESS NOTES
21  Cammie Dunbar : 1945 Sex: male  Age 68 y.o. Subjective:  Chief Complaint   Patient presents with    Otalgia     L side in and behind ear x2d          HPI:   Cammie Dunbar , 68 y.o. male presents to [facility] for evaluation of left ear pain    HPI  80-year-old male presents to [facility] for evaluation of sinus congestion, drainage, left ear pain. The patient is had the symptoms ongoing for the last 2 to 3 days. The patient states that his sinuses are always bad but as of late they have been a little bit increased and has noted increased congestion drainage. The patient denies any fever, chills, lightheadedness, dizziness. The patient not having neck pain, back pain. No lateralizing weakness of the upper or lower extremities. The patient states that he has been using his chronic nasal spray and some over-the-counter decongestants at home but has not really been helping. ROS:   Unless otherwise stated in this report the patient's positive and negative responses for review of systems for constitutional, eyes, ENT, cardiovascular, respiratory, gastrointestinal, neurological, , musculoskeletal, and integument systems and related systems to the presenting problem are either stated in the history of present illness or were not pertinent or were negative for the symptoms and/or complaints related to the presenting medical problem. Positives and pertinent negatives as per HPI. All others reviewed and are negative.       PMH:     Past Medical History:   Diagnosis Date    CAD (coronary artery disease)     Dr. Jeremy Mcclellan Chronic back pain     Colon polyp     Kidney stones     Sciatica     Sinusitis     TIA (transient ischemic attack)     Unspecified cerebral artery occlusion with cerebral infarction 2013    TIA- no deficits        Past Surgical History:   Procedure Laterality Date    COLONOSCOPY  2015    COLONOSCOPY  2018    April [place] ARTERY BYPASS GRAFT  11/01/2013    x 4  Dr. Sophia Khan ENDOSCOPY, COLON, DIAGNOSTIC      EPIDURAL STEROID INJECTION N/A 2019    L5-S1 EPIDURAL STEROID INJECTION performed by Rossy Lobo DO at [facility]?  INGUINAL HERNIA REPAIR Right 2018    [facility]    LUMBAR SPINE SURGERY  2019    decompression L3-L5 [place] at [address]         History reviewed. No pertinent family history. Medications:     Current Outpatient Medications:     methylPREDNISolone (MEDROL DOSEPACK) 4 MG tablet, Take by mouth., Disp: 1 kit, Rfl: 0    chlorpheniramine (ALLER-CHLOR) 4 MG tablet, Take 1 tablet by mouth every 6 hours as needed for Allergies, Disp: 20 tablet, Rfl: 0    amoxicillin (AMOXIL) 875 MG tablet, Take 1 tablet by mouth 2 times daily for 10 days, Disp: 20 tablet, Rfl: 0    gabapentin (NEURONTIN) 800 MG tablet, Take 1 tablet by mouth 3 times daily for 91 days. , Disp: 90 tablet, Rfl: 2    simvastatin (ZOCOR) 80 MG tablet, Take 1 tablet by mouth nightly, Disp: 90 tablet, Rfl: 2    tamsulosin (FLOMAX) 0.4 MG capsule, Take 0.4 mg by mouth daily, Disp: , Rfl:     aspirin 81 MG chewable tablet, Take 1 tablet by mouth daily. , Disp: 30 tablet, Rfl: 3    fluticasone (FLONASE) 50 MCG/ACT nasal spray, 1 spray by Nasal route daily. , Disp: , Rfl:     Allergies:   No Known Allergies    Social History:     Social History     Tobacco Use    Smoking status: Former Smoker     Packs/day: 1.00     Years: 30.00     Pack years: 30.00     Quit date: 1966     Years since quittin.5    Smokeless tobacco: Never Used   Vaping Use    Vaping Use: Never used   Substance Use Topics    Alcohol use: Not Currently     Alcohol/week: 0.0 standard drinks    Drug use: No       Patient lives at home.     Physical Exam:     Vitals:    21 1006   BP: 132/64   Pulse: 62   Temp: 97.6 °F (36.4 °C)   SpO2: 97%   Weight: 191 lb (86.6 kg)       Exam:  Physical Exam  Nurse's notes and vital signs reviewed. The patient is not hypoxic. ? General: Alert, no acute distress, patient resting comfortably Patient is not toxic or lethargic. Skin: Warm, intact, no pallor noted. There is no evidence of rash at this time. Head: Normocephalic, atraumatic, no significant temporal arterial tenderness  Eye: Normal conjunctiva  Ears, Nose, Throat: Right tympanic membrane clear, left tympanic membrane erythema noted to the superior portion of the tympanic membrane but no diffuse erythema and no significant bulging. No drainage or discharge noted. No pre- or post-auricular tenderness, erythema, or swelling noted. Minimal congestion  Posterior oropharynx shows no erythema, tonsillar hypertrophy, or exudate. the uvula is midline. No trismus or drooling is noted. Moist mucous membranes. Neck: No anterior/posterior lymphadenopathy noted. No erythema, no masses, no fluctuance or induration noted. No meningeal signs. Cardiovascular: Regular Rate and Rhythm  Respiratory: No acute distress, no rhonchi, wheezing or crackles noted. No stridor or retractions are noted. Neurological: A&O x4, normal speech  Psychiatric: Cooperative         Testing:           Medical Decision Making:     Vital signs reviewed    Past medical history reviewed. Allergies reviewed. Medications reviewed. Patient on arrival does not appear to be in any apparent distress or discomfort. The patient has been seen and evaluated. The patient does not appear to be toxic or lethargic. The patient's left ear is not overwhelming for otitis media although it does appear to be erythematous compared to the right. The patient does have some nasal congestion rhinorrhea. We discussed differential diagnosis with the patient. I offered to send him to the emergency department for a CT of the head and neck. The patient is declining at this time. He would like to try the medications to help with some of the sinus congestion and treat the left ear. The patient will be given a Medrol Dosepak, chlorphentermine. The patient is going to be started on amoxicillin. The patient will go directly to the ED if any of the signs or symptoms change or worsen. The patient understands plan has no other questions or concerns at this time. Patient is to push fluids get plenty of rest.  Follow-up with PCP. The patient was educated on the proper dosage of motrin and tylenol and the appropriate intervals of each. The patient is to increase fluid intake over the next several days. The patient is to use OTC decongestant as needed. The patient is to return to express care or go directly to the emergency department should any of the signs or symptoms worsen. The patient is to followup with primary care physician in 2-3 days for repeat evaluation. The patient has no other questions or concerns at this time the patient will be discharged home. Clinical Impression:   Dannie Panchal was seen today for otalgia. Diagnoses and all orders for this visit:    Acute non-recurrent sinusitis, unspecified location    Nasal congestion    Left otitis media, unspecified otitis media type    Other orders  -     methylPREDNISolone (MEDROL DOSEPACK) 4 MG tablet; Take by mouth.  -     chlorpheniramine (ALLER-CHLOR) 4 MG tablet; Take 1 tablet by mouth every 6 hours as needed for Allergies  -     amoxicillin (AMOXIL) 875 MG tablet; Take 1 tablet by mouth 2 times daily for 10 days        The patient is to call for any concerns or return if any of the signs or symptoms worsen. The patient is to follow-up with PCP in the next 2-3 days for repeat evaluation repeat assessment or go directly to the emergency department.      SIGNATURE: Bella Garcia III, PA-C

## 2021-09-29 ENCOUNTER — OFFICE VISIT (OUTPATIENT)
Dept: CARDIOLOGY CLINIC | Age: 76
End: 2021-09-29
Payer: MEDICARE

## 2021-09-29 VITALS
OXYGEN SATURATION: 96 % | WEIGHT: 185.6 LBS | DIASTOLIC BLOOD PRESSURE: 72 MMHG | SYSTOLIC BLOOD PRESSURE: 128 MMHG | HEIGHT: 71 IN | HEART RATE: 72 BPM | RESPIRATION RATE: 16 BRPM | BODY MASS INDEX: 25.98 KG/M2

## 2021-09-29 DIAGNOSIS — G45.9 TIA (TRANSIENT ISCHEMIC ATTACK): ICD-10-CM

## 2021-09-29 DIAGNOSIS — I25.10 CORONARY ARTERY DISEASE INVOLVING NATIVE CORONARY ARTERY OF NATIVE HEART WITHOUT ANGINA PECTORIS: ICD-10-CM

## 2021-09-29 DIAGNOSIS — R00.1 SINUS BRADYCARDIA: Primary | ICD-10-CM

## 2021-09-29 PROCEDURE — 4040F PNEUMOC VAC/ADMIN/RCVD: CPT | Performed by: INTERNAL MEDICINE

## 2021-09-29 PROCEDURE — 1036F TOBACCO NON-USER: CPT | Performed by: INTERNAL MEDICINE

## 2021-09-29 PROCEDURE — 99213 OFFICE O/P EST LOW 20 MIN: CPT | Performed by: INTERNAL MEDICINE

## 2021-09-29 PROCEDURE — G8427 DOCREV CUR MEDS BY ELIG CLIN: HCPCS | Performed by: INTERNAL MEDICINE

## 2021-09-29 PROCEDURE — 1123F ACP DISCUSS/DSCN MKR DOCD: CPT | Performed by: INTERNAL MEDICINE

## 2021-09-29 PROCEDURE — G8417 CALC BMI ABV UP PARAM F/U: HCPCS | Performed by: INTERNAL MEDICINE

## 2021-09-29 PROCEDURE — 93000 ELECTROCARDIOGRAM COMPLETE: CPT | Performed by: INTERNAL MEDICINE

## 2021-09-29 NOTE — PROGRESS NOTES
OFFICE FOLLOW-UP    Name: Larissa Blanc  Age: 68 y.o. Primary Care Physician: Ozzie Chavez DO    Date of Service: 9/29/2021    Chief Complaint: Follow-up for CAD s/p CABG on 11/4/13    Interim History:   No new cardiac complaints since hospital discharge or last office visit. No complaints of angina, dyspnea on exertion, palpitations, dizziness, lightheadedness, or pre-syncopal episodes (he still walks/rides bike 2 miles/day, golfs, etc with no cardiac complaints). Review of Systems:   Cardiac: As per HPI  General: No fever, chills  Pulmonary: As per HPI  HEENT: No visual disturbances, difficult swallowing  GI: No nausea, vomiting  : No dysuria, hematuria  Endocrine: No thyroid disease or DM  Musculoskeletal: RIBEIRO x 4, no focal motor deficits  Skin: Intact, no rashes  Neuro: No headache, seizures  Psych: Currently with no depression, anxiety    Past Medical History:  Past Medical History:   Diagnosis Date    CAD (coronary artery disease)     Dr. Giana Agrawal     Chronic back pain     Colon polyp     Kidney stones     Sciatica     Sinusitis     TIA (transient ischemic attack)     Unspecified cerebral artery occlusion with cerebral infarction March 2013    TIA- no deficits      Past Surgical History:  Past Surgical History:   Procedure Laterality Date    COLONOSCOPY  08/04/2015    COLONOSCOPY  09/05/2018    [facility]    CORONARY ARTERY BYPASS GRAFT  11/01/2013    x 4  Dr. Emily Mendoza ENDOSCOPY, COLON, DIAGNOSTIC      EPIDURAL STEROID INJECTION N/A 6/13/2019    L5-S1 EPIDURAL STEROID INJECTION performed by Sunday Sarah DO at [facility]?     INGUINAL HERNIA REPAIR Right 11/09/2018    [facility]    LUMBAR SPINE SURGERY  08/26/2019    [place]compression L3-L5 [place] [facility] [place]    TONSILLECTOMY       Social History:  Social History     Socioeconomic History    Marital status:      Spouse name: Not on file    Number of children: Not on file    Years of education: Not on Nasal route daily. No current facility-administered medications for this visit. Physical Exam:  /72   Pulse 72   Resp 16   Ht 5' 11\" (1.803 m)   Wt 185 lb 9.6 oz (84.2 kg)   SpO2 96%   BMI 25.89 kg/m²   Wt Readings from Last 3 Encounters:   09/29/21 185 lb 9.6 oz (84.2 kg)   09/23/21 191 lb (86.6 kg)   06/22/21 183 lb (83 kg)     Appearance: Awake, alert, no acute respiratory distress  Skin: Intact, no rash  Head: Normocephalic, atraumatic  Eyes: EOMI, no conjunctival erythema  ENMT: No pharyngeal erythema, MMM, no rhinorrhea  Neck: Supple, no elevated JVP, no carotid bruits  Lungs: Clear to auscultation bilaterally. No wheezes, rales, or rhonchi.   Cardiac: Regular rate and rhythm, +S1S2, no murmurs apparent  Abdomen: Soft, nontender, +bowel sounds  Extremities: Moves all extremities x 4, no lower extremity edema  Neurologic: No focal motor deficits apparent, normal mood and affect  Peripheral Pulses: Intact posterior tibial pulses bilaterally    Laboratory Tests:  Lab Results   Component Value Date    CREATININE 0.8 11/30/2020    BUN 11 11/30/2020     11/30/2020    K 4.9 11/30/2020     11/30/2020    CO2 28 11/30/2020     Lab Results   Component Value Date    WBC 7.2 09/25/2019    RBC 3.77 09/25/2019    HGB 10.9 09/25/2019    HCT 34.3 09/25/2019    MCV 91.0 09/25/2019    MCH 28.9 09/25/2019    MCHC 31.8 09/25/2019    RDW 12.2 09/25/2019     09/25/2019    MPV 9.2 09/25/2019     Lab Results   Component Value Date    MG 2.3 11/05/2013     Lab Results   Component Value Date    PROTIME 14.3 11/04/2013    INR 1.5 11/04/2013     Lab Results   Component Value Date    TSH 1.115 11/03/2013     Lab Results   Component Value Date    TRIG 96 11/30/2020    TRIG 69 06/08/2020    TRIG 116 09/06/2019     Lab Results   Component Value Date    HDL 51 11/30/2020    HDL 64 06/08/2020    HDL 41 09/06/2019     Lab Results   Component Value Date    LDLCALC 72 11/30/2020    [address] 75 06/08/2020 [address] 91 09/06/2019     Cardiac Tests:  ECG: SR, rate 71, no acute STT changes    ASSESSMENT / PLAN:  1. CAD/NSTEMI s/p CABG on 11/4/13 (LIMA-LAD, SVG-D1, SVG-OM1, and SVG-PDA) -- currently with no active cardiac complaints  2. Prior TIA  3. Normal LV systolic function with no wall motion abnormalities on LV gram (EF 60%)  4.  History of sinus bradycardia    - Continue ASA and statin  - Results of 11/2020 lipid panel reviewed with the patient (previously discussed option of switching to lipitor or crestor)  - Beta blocker discontinued at prior office visit due to bradycardia and dizziness (no further complaints)  - Follow-up in 1 year      Rupal Garcia MD  [facility]) Cardiology

## 2021-10-06 DIAGNOSIS — M48.062 SPINAL STENOSIS OF LUMBAR REGION WITH NEUROGENIC CLAUDICATION: Primary | ICD-10-CM

## 2021-10-06 RX ORDER — METHOCARBAMOL 750 MG/1
750 TABLET, FILM COATED ORAL 4 TIMES DAILY PRN
Qty: 120 TABLET | Refills: 0 | Status: SHIPPED | OUTPATIENT
Start: 2021-10-06 | End: 2021-11-05

## 2021-10-06 RX ORDER — TRAMADOL HYDROCHLORIDE 50 MG/1
50 TABLET ORAL DAILY PRN
Qty: 30 TABLET | Refills: 0 | Status: SHIPPED
Start: 2021-10-06 | End: 2022-01-11 | Stop reason: SDUPTHER

## 2021-12-13 DIAGNOSIS — I25.10 CORONARY ARTERY DISEASE INVOLVING NATIVE HEART WITHOUT ANGINA PECTORIS, UNSPECIFIED VESSEL OR LESION TYPE: ICD-10-CM

## 2021-12-13 RX ORDER — SIMVASTATIN 80 MG
80 TABLET ORAL NIGHTLY
Qty: 90 TABLET | Refills: 1 | Status: SHIPPED
Start: 2021-12-13 | End: 2022-06-15 | Stop reason: SDUPTHER

## 2021-12-13 RX ORDER — GABAPENTIN 800 MG/1
800 TABLET ORAL 3 TIMES DAILY
Qty: 90 TABLET | Refills: 1 | Status: SHIPPED
Start: 2021-12-13 | End: 2022-02-24 | Stop reason: SDUPTHER

## 2021-12-29 ENCOUNTER — TELEPHONE (OUTPATIENT)
Dept: PHARMACY | Facility: CLINIC | Age: 76
End: 2021-12-29

## 2021-12-29 NOTE — TELEPHONE ENCOUNTER
For Pharmacy [address] in place:  No   Recommendation Provided To:    Intervention Detail: Adherence Monitorin   Gap Closed?: No    Intervention Accepted By:   Wendi Browning Time Spent (min): 15

## 2021-12-29 NOTE — TELEPHONE ENCOUNTER
[facility] CLINICAL PHARMACY REVIEW: ADHERENCE REVIEW  Identified care gap per Jerry; fills at [facility]: Statin adherence      Last Visit: 09.23.21    ASSESSMENT  STATIN ADHERENCE  Per Insurance Records through 12.15.21 :   RX:  Simvastatin 80 mg tab  last filled on 12.13.21 for 90 day supply. Next refill due: 03.13.2022. Fail Date:  12.30.21. Per Reconciled Dispense Report:  Last filled 07.30.21 for 90 day supply     Per Pharmacy: Karalee Libman: Simvastatin 80 mg tab   Received refill 12/13/21 and patient had it transferred to [facility] DR KAMRAN NOLAN in [place], [place]. Lab Results   Component Value Date    CHOL 142 11/30/2020    TRIG 96 11/30/2020    HDL 51 11/30/2020    LDLCALC 72 11/30/2020     ALT   Date Value Ref Range Status   11/30/2020 18 0 - 40 U/L Final     AST   Date Value Ref Range Status   11/30/2020 21 0 - 39 U/L Final     The ASCVD Risk score (Jeralene Brunner., et al., 2013) failed to calculate for the following reasons: The patient has a prior MI or stroke diagnosis     PLAN  The following are interventions that have been identified:     - Patient recently received 90 day supply with 1 additional refill. No patient outreached planned at this time. No future appointments.     Juli Adams LPN  Population Health   [address] [facility] Clinical Pharm[facility]  Phone: toll free [phone]

## 2022-01-11 DIAGNOSIS — M48.062 SPINAL STENOSIS OF LUMBAR REGION WITH NEUROGENIC CLAUDICATION: ICD-10-CM

## 2022-01-11 RX ORDER — TRAMADOL HYDROCHLORIDE 50 MG/1
50 TABLET ORAL DAILY PRN
Qty: 30 TABLET | Refills: 0 | Status: SHIPPED
Start: 2022-01-11 | End: 2022-01-12 | Stop reason: SDUPTHER

## 2022-01-12 DIAGNOSIS — M48.062 SPINAL STENOSIS OF LUMBAR REGION WITH NEUROGENIC CLAUDICATION: ICD-10-CM

## 2022-01-12 RX ORDER — TRAMADOL HYDROCHLORIDE 50 MG/1
50 TABLET ORAL DAILY PRN
Qty: 30 TABLET | Refills: 0 | Status: SHIPPED | OUTPATIENT
Start: 2022-01-12 | End: 2022-02-11

## 2022-02-24 RX ORDER — GABAPENTIN 800 MG/1
800 TABLET ORAL 3 TIMES DAILY
Qty: 90 TABLET | Refills: 0 | Status: SHIPPED
Start: 2022-02-24 | End: 2022-03-23 | Stop reason: SDUPTHER

## 2022-03-23 RX ORDER — GABAPENTIN 800 MG/1
800 TABLET ORAL 3 TIMES DAILY
Qty: 90 TABLET | Refills: 2 | Status: SHIPPED
Start: 2022-03-23 | End: 2022-05-03 | Stop reason: SDUPTHER

## 2022-05-02 RX ORDER — GABAPENTIN 800 MG/1
800 TABLET ORAL 3 TIMES DAILY
Qty: 90 TABLET | Refills: 2 | OUTPATIENT
Start: 2022-05-02 | End: 2022-08-01

## 2022-05-03 RX ORDER — GABAPENTIN 800 MG/1
800 TABLET ORAL 3 TIMES DAILY
Qty: 90 TABLET | Refills: 0 | Status: CANCELLED | OUTPATIENT
Start: 2022-05-03 | End: 2022-06-02

## 2022-05-03 RX ORDER — GABAPENTIN 800 MG/1
800 TABLET ORAL 3 TIMES DAILY
Qty: 90 TABLET | Refills: 0 | Status: SHIPPED
Start: 2022-05-03 | End: 2022-05-05 | Stop reason: SDUPTHER

## 2022-05-05 ENCOUNTER — OFFICE VISIT (OUTPATIENT)
Dept: PRIMARY CARE CLINIC | Age: 77
End: 2022-05-05
Payer: MEDICARE

## 2022-05-05 VITALS
OXYGEN SATURATION: 97 % | BODY MASS INDEX: 26.36 KG/M2 | DIASTOLIC BLOOD PRESSURE: 70 MMHG | WEIGHT: 189 LBS | HEART RATE: 68 BPM | SYSTOLIC BLOOD PRESSURE: 128 MMHG | TEMPERATURE: 97.9 F

## 2022-05-05 DIAGNOSIS — Z12.5 SCREENING PSA (PROSTATE SPECIFIC ANTIGEN): ICD-10-CM

## 2022-05-05 DIAGNOSIS — E78.2 MIXED HYPERLIPIDEMIA: ICD-10-CM

## 2022-05-05 DIAGNOSIS — I25.119 CORONARY ARTERY DISEASE INVOLVING NATIVE HEART WITH ANGINA PECTORIS, UNSPECIFIED VESSEL OR LESION TYPE (HCC): ICD-10-CM

## 2022-05-05 DIAGNOSIS — Z00.00 MEDICARE ANNUAL WELLNESS VISIT, SUBSEQUENT: Primary | ICD-10-CM

## 2022-05-05 LAB
ALBUMIN SERPL-MCNC: 4.3 G/DL (ref 3.5–5.2)
ALP BLD-CCNC: 69 U/L (ref 40–129)
ALT SERPL-CCNC: 23 U/L (ref 0–40)
ANION GAP SERPL CALCULATED.3IONS-SCNC: 10 MMOL/L (ref 7–16)
AST SERPL-CCNC: 22 U/L (ref 0–39)
BILIRUB SERPL-MCNC: 0.4 MG/DL (ref 0–1.2)
BUN BLDV-MCNC: 10 MG/DL (ref 6–23)
CALCIUM SERPL-MCNC: 9.1 MG/DL (ref 8.6–10.2)
CHLORIDE BLD-SCNC: 110 MMOL/L (ref 98–107)
CHOLESTEROL, TOTAL: 146 MG/DL (ref 0–199)
CO2: 25 MMOL/L (ref 22–29)
CREAT SERPL-MCNC: 0.9 MG/DL (ref 0.7–1.2)
GFR AFRICAN AMERICAN: >60
GFR NON-AFRICAN AMERICAN: >60 ML/MIN/1.73
GLUCOSE BLD-MCNC: 116 MG/DL (ref 74–99)
HDLC SERPL-MCNC: 71 MG/DL
LDL CHOLESTEROL CALCULATED: 62 MG/DL (ref 0–99)
POTASSIUM SERPL-SCNC: 4.6 MMOL/L (ref 3.5–5)
PROSTATE SPECIFIC ANTIGEN: 3.28 NG/ML (ref 0–4)
SODIUM BLD-SCNC: 145 MMOL/L (ref 132–146)
TOTAL PROTEIN: 7.1 G/DL (ref 6.4–8.3)
TRIGL SERPL-MCNC: 63 MG/DL (ref 0–149)
VLDLC SERPL CALC-MCNC: 13 MG/DL

## 2022-05-05 PROCEDURE — G0439 PPPS, SUBSEQ VISIT: HCPCS | Performed by: FAMILY MEDICINE

## 2022-05-05 PROCEDURE — 4040F PNEUMOC VAC/ADMIN/RCVD: CPT | Performed by: FAMILY MEDICINE

## 2022-05-05 PROCEDURE — 1123F ACP DISCUSS/DSCN MKR DOCD: CPT | Performed by: FAMILY MEDICINE

## 2022-05-05 RX ORDER — GABAPENTIN 800 MG/1
800 TABLET ORAL 3 TIMES DAILY
Qty: 270 TABLET | Refills: 3 | Status: SHIPPED | OUTPATIENT
Start: 2022-05-05 | End: 2022-09-09

## 2022-05-05 ASSESSMENT — LIFESTYLE VARIABLES: HOW OFTEN DO YOU HAVE A DRINK CONTAINING ALCOHOL: NEVER

## 2022-05-05 ASSESSMENT — PATIENT HEALTH QUESTIONNAIRE - PHQ9
SUM OF ALL RESPONSES TO PHQ QUESTIONS 1-9: 0
SUM OF ALL RESPONSES TO PHQ QUESTIONS 1-9: 0
SUM OF ALL RESPONSES TO PHQ9 QUESTIONS 1 & 2: 0
SUM OF ALL RESPONSES TO PHQ QUESTIONS 1-9: 0
SUM OF ALL RESPONSES TO PHQ QUESTIONS 1-9: 0
1. LITTLE INTEREST OR PLEASURE IN DOING THINGS: 0
2. FEELING DOWN, DEPRESSED OR HOPELESS: 0

## 2022-05-05 ASSESSMENT — ENCOUNTER SYMPTOMS: SHORTNESS OF BREATH: 0

## 2022-05-05 NOTE — PATIENT INSTRUCTIONS
Personalized Preventive Plan for Domingo Fearing - 5/5/2022  Medicare offers a range of preventive health benefits. Some of the tests and screenings are paid in full while other may be subject to a deductible, co-insurance, and/or copay. Some of these benefits include a comprehensive review of your medical history including lifestyle, illnesses that may run in your family, and various assessments and screenings as appropriate. After reviewing your medical record and screening and assessments performed today your provider may have ordered immunizations, labs, imaging, and/or referrals for you. A list of these orders (if applicable) as well as your Preventive Care list are included within your After Visit Summary for your review. Other Preventive Recommendations:    · A preventive eye exam performed by an eye specialist is recommended every 1-2 years to screen for glaucoma; cataracts, macular degeneration, and other eye disorders. · A preventive dental visit is recommended every 6 months. · Try to get at least 150 minutes of exercise per week or 10,000 steps per day on a pedometer . · Order or download the FREE \"Exercise & Physical Activity: Your Everyday Guide\" from [employer]. Call 4-730.137.6857 or search [employer] online. · You need 8070-6634 mg of calcium and 9554-1906 IU of vitamin D per day. It is possible to meet your calcium requirement with diet alone, but a vitamin D supplement is usually necessary to meet this goal.  · When exposed to the sun, use a sunscreen that protects against both UVA and UVB radiation with an SPF of 30 or greater. Reapply every 2 to 3 hours or after sweating, drying off with a towel, or swimming. · Always wear a seat belt when traveling in a car. Always wear a helmet when riding a bicycle or motorcycle.

## 2022-05-05 NOTE — PROGRESS NOTES
Medicare Annual Wellness Visit    Lety Hill is here for Medicare AWV    Assessment & Plan   Medicare annual wellness visit, subsequent  Mixed hyperlipidemia  -     Lipid Panel; Future  -     Comprehensive Metabolic Panel; Future  Screening PSA (prostate specific antigen)  -     PSA Screening; Future  Coronary artery disease involving native heart with angina pectoris, unspecified vessel or lesion type [facility])      Recommendations for Preventive Services Due: see orders and patient instructions/AVS.  Recommended screening schedule for the next 5-10 years is provided to the patient in written form: see Patient Instructions/AVS.     Return for Medicare Annual Wellness Visit in 1 year. Subjective   The following acute and/or chronic problems were also addressed today:    Lety Hill, a male of 68 y.o. came to the office 5/5/2022. Patient Active Problem List   Diagnosis    HTN (hypertension), benign    NSTEMI (non-ST elevated myocardial infarction) ([address]    TIA (transient ischemic attack)    S/P CABG x 4    Chronic pain syndrome    Spinal stenosis of lumbar region with neurogenic claudication    Chronic bilateral low back pain with bilateral sciatica    Coronary artery disease involving native heart with angina pectoris ([address]          Hyperlipidemia  This is a chronic problem. The current episode started more than 1 year ago. The problem is controlled. Pertinent negatives include no chest pain, myalgias or shortness of breath. Current antihyperlipidemic treatment includes statins. The current treatment provides moderate improvement of lipids. There are no compliance problems. Coronary Artery Disease  Presents for follow-up visit. Pertinent negatives include no chest pain, dizziness or shortness of breath. Risk factors include hyperlipidemia. The symptoms have been resolved. Compliance with diet is good. Compliance with exercise is good. Compliance with medications is good.       Back pain: doing well on Neurontin with his back. Using lidocaine patches on low and buttock when golfs which prevents pain. No Known Allergies    Current Outpatient Medications on File Prior to Visit   Medication Sig Dispense Refill    simvastatin (ZOCOR) 80 MG tablet Take 1 tablet by mouth nightly 90 tablet 1    tamsulosin (FLOMAX) 0.4 MG capsule Take 0.4 mg by mouth daily      aspirin 81 MG chewable tablet Take 1 tablet by mouth daily. 30 tablet 3    fluticasone (FLONASE) 50 MCG/ACT nasal spray 1 spray by Nasal route daily. No current facility-administered medications on file prior to visit. Review of Systems   Respiratory: Negative for shortness of breath. Cardiovascular: Negative for chest pain. Musculoskeletal: Negative for myalgias. Neurological: Negative for dizziness. other review of systems reviewed and are negative    OBJECTIVE:  /70   Pulse 68   Temp 97.9 °F (36.6 °C)   Wt 189 lb (85.7 kg)   SpO2 97%   BMI 26.36 kg/m²      Physical Exam see below. ASSESSMENT AND PLAN:    Gabe Diehl was seen today for medicare aw. Diagnoses and all orders for this visit:    Medicare annual wellness visit, subsequent    Mixed hyperlipidemia  -     Lipid Panel; Future  -     Comprehensive Metabolic Panel; Future    Screening PSA (prostate specific antigen)  -     PSA Screening; Future    Coronary artery disease involving native heart with angina pectoris, unspecified vessel or lesion type ([facility]    Other orders  -     gabapentin (NEURONTIN) 800 MG tablet; Take 1 tablet by mouth 3 times daily for 30 days. - continue statin, diet, and exercise  - follow up with cardiologist as scheduled. Return for Medicare Annual Wellness Visit in 1 year. Lucila Tyler DO      Patient's complete Health Risk Assessment and screening values have been reviewed and are found in Flowsheets.  The following problems were reviewed today and where indicated follow up appointments were made and/or referrals ordered. Positive Risk Factor Screenings with Interventions:               General Health and ACP:  General  In general, how would you say your health is?: Excellent  In the past 7 days, have you experienced any of the following: New or Increased Pain, New or Increased Fatigue, Loneliness, Social Isolation, Stress or Anger?: No  Do you get the social and emotional support that you need?: Yes  Do you have a Living Will?: Yes    Advance Directives     Power of  Living Will ACP-Advance Directive ACP-Power of     Not on File Not on File Not on File Not on File      General Health Risk Interventions:  ·      Hearing/Vision:  Do you or your family notice any trouble with your hearing that hasn't been managed with hearing aids?: No  Do you have difficulty driving, watching TV, or doing any of your daily activities because of your eyesight?: No  Have you had an eye exam within the past year?: (!) No  No exam data present    Hearing/Vision Interventions:  · Vision concerns:  patient encouraged to make appointment with his/her eye specialist            Objective   Vitals:    05/05/22 0919   BP: 128/70   Pulse: 68   Temp: 97.9 °F (36.6 °C)   SpO2: 97%   Weight: 189 lb (85.7 kg)      Body mass index is 26.36 kg/m².         General Appearance: alert and oriented to person, place and time, well developed and well- nourished, in no acute distress  Skin: warm and dry, no rash or erythema  Head: normocephalic and atraumatic  Eyes: pupils equal, round, and reactive to light, extraocular eye movements intact, conjunctivae normal  ENT: tympanic membrane, external ear and ear canal normal bilaterally, nose without deformity, nasal mucosa and turbinates normal without polyps  Neck: supple and non-tender without mass, no thyromegaly or thyroid nodules, no cervical lymphadenopathy  Pulmonary/Chest: clear to auscultation bilaterally- no wheezes, rales or rhonchi, normal air movement, no respiratory distress  Cardiovascular: normal rate, regular rhythm, normal S1 and S2, no murmurs, rubs, clicks, or gallops, distal pulses intact, no carotid bruits  Abdomen: soft, non-tender, non-distended, normal bowel sounds, no masses or organomegaly  Extremities: no cyanosis, clubbing or edema  Musculoskeletal: normal range of motion, no joint swelling, deformity or tenderness  Neurologic: reflexes normal and symmetric, no cranial nerve deficit, gait, coordination and speech normal       No Known Allergies  Prior to Visit Medications    Medication Sig Taking? Authorizing Provider   gabapentin (NEURONTIN) 800 MG tablet Take 1 tablet by mouth 3 times daily for 30 days. Yes Kenny Tyler DO   simvastatin (ZOCOR) 80 MG tablet Take 1 tablet by mouth nightly  Kenny Tyler DO   tamsulosin (FLOMAX) 0.4 MG capsule Take 0.4 mg by mouth daily  Historical Provider, MD   aspirin 81 MG chewable tablet Take 1 tablet by mouth daily. Phani Rodriguez DO   fluticasone (FLONASE) 50 MCG/ACT nasal spray 1 spray by Nasal route daily.   Historical Provider, MD Flores (Including outside providers/suppliers regularly involved in providing care):   Patient Care Team:  Cosmo Garcia DO as PCP - General (Family Medicine)  Cosmo Garcia DO as PCP - [facility] Empaneled Provider  Ginette Teague MD as Consulting Physician (Cardiology)  Phani Rodriguez DO as Surgeon (Cardiothoracic Surgery)  Sobia Sanford DO as Consulting Physician (Pain Medicine)    Reviewed and updated this visit:  Tobacco  Allergies  Meds  Problems  Med Hx  Surg Hx  Soc Hx  Fam Hx

## 2022-06-15 DIAGNOSIS — I25.10 CORONARY ARTERY DISEASE INVOLVING NATIVE HEART WITHOUT ANGINA PECTORIS, UNSPECIFIED VESSEL OR LESION TYPE: ICD-10-CM

## 2022-06-15 RX ORDER — SIMVASTATIN 80 MG
80 TABLET ORAL NIGHTLY
Qty: 90 TABLET | Refills: 0 | Status: SHIPPED
Start: 2022-06-15 | End: 2022-08-29

## 2022-08-29 DIAGNOSIS — I25.10 CORONARY ARTERY DISEASE INVOLVING NATIVE HEART WITHOUT ANGINA PECTORIS, UNSPECIFIED VESSEL OR LESION TYPE: ICD-10-CM

## 2022-08-29 RX ORDER — SIMVASTATIN 80 MG
80 TABLET ORAL NIGHTLY
Qty: 90 TABLET | Refills: 1 | Status: SHIPPED | OUTPATIENT
Start: 2022-08-29

## 2022-09-09 ENCOUNTER — OFFICE VISIT (OUTPATIENT)
Dept: CARDIOLOGY CLINIC | Age: 77
End: 2022-09-09
Payer: MEDICARE

## 2022-09-09 VITALS
BODY MASS INDEX: 25.27 KG/M2 | SYSTOLIC BLOOD PRESSURE: 118 MMHG | DIASTOLIC BLOOD PRESSURE: 66 MMHG | RESPIRATION RATE: 16 BRPM | HEIGHT: 72 IN | HEART RATE: 59 BPM | WEIGHT: 186.6 LBS

## 2022-09-09 DIAGNOSIS — Z95.1 HX OF CABG: ICD-10-CM

## 2022-09-09 DIAGNOSIS — I10 HTN (HYPERTENSION), BENIGN: ICD-10-CM

## 2022-09-09 DIAGNOSIS — Z86.73 HISTORY OF TRANSIENT ISCHEMIC ATTACK (TIA): ICD-10-CM

## 2022-09-09 DIAGNOSIS — I25.10 CORONARY ARTERY DISEASE INVOLVING NATIVE CORONARY ARTERY OF NATIVE HEART WITHOUT ANGINA PECTORIS: Primary | ICD-10-CM

## 2022-09-09 PROCEDURE — 99213 OFFICE O/P EST LOW 20 MIN: CPT | Performed by: INTERNAL MEDICINE

## 2022-09-09 PROCEDURE — 1123F ACP DISCUSS/DSCN MKR DOCD: CPT | Performed by: INTERNAL MEDICINE

## 2022-09-09 PROCEDURE — 93000 ELECTROCARDIOGRAM COMPLETE: CPT | Performed by: INTERNAL MEDICINE

## 2022-09-09 NOTE — PROGRESS NOTES
OFFICE FOLLOW-UP    Name: Kailee Elizabeth  Age: 68 y.o. Primary Care Physician: Rodríguez Grimes DO    Date of Service: 9/9/2022    Chief Complaint: Follow-up for CAD s/p CABG on 11/4/13    Interim History:   No new cardiac complaints since hospital discharge or last office visit. No complaints of angina, dyspnea on exertion, palpitations, dizziness, lightheadedness, or pre-syncopal episodes (he still walks/rides bike 3-4 miles/day, golfs, etc with no cardiac complaints). Review of Systems:   Cardiac: As per HPI  General: No fever, chills  Pulmonary: As per HPI  HEENT: No visual disturbances, difficult swallowing  GI: No nausea, vomiting  : No dysuria, hematuria  Endocrine: No thyroid disease or DM  Musculoskeletal: RIBEIRO x 4, no focal motor deficits  Skin: Intact, no rashes  Neuro: No headache, seizures  Psych: Currently with no depression, anxiety    Past Medical History:  Past Medical History:   Diagnosis Date    CAD (coronary artery disease)     Dr. Nicole Lomeli     Chronic back pain     Colon polyp     Kidney stones     Sciatica     Sinusitis     TIA (transient ischemic attack)     Unspecified cerebral artery occlusion with cerebral infarction March 2013    TIA- no deficits      Past Surgical History:  Past Surgical History:   Procedure Laterality Date    COLONOSCOPY  08/04/2015    COLONOSCOPY  09/05/2018    Milosevic    [facility]    CORONARY ARTERY BYPASS GRAFT  11/01/2013    x 4  Dr. Miller [facility]    ENDOSCOPY, COLON, DIAGNOSTIC      EPIDURAL STEROID INJECTION N/A 06/13/2019    L5-S1 EPIDURAL STEROID INJECTION performed by Raghav Knight DO at [address]?     INGUINAL HERNIA REPAIR Right 11/09/2018    [facility]ose[facility]    LUMBAR SPINE SURGERY  08/26/2019    decompression L3-L5 [place] at [facility] [place]    TONSILLECTOMY       Social History:  Social History     Socioeconomic History    Marital status:      Spouse name: Not on file    Number of children: Not on file    Years of education: Not on file    Highest education level: Not on file   Occupational History    Not on file   Tobacco Use    Smoking status: Former     Packs/day: 1.00     Years: 30.00     Pack years: 30.00     Types: Cigarettes     Quit date: 1966     Years since quittin.8    Smokeless tobacco: Never   Vaping Use    Vaping Use: Never used   Substance and Sexual Activity    Alcohol use: Not Currently     Alcohol/week: 0.0 standard drinks    Drug use: No    Sexual activity: Not on file   Other Topics Concern    Not on file   Social History Narrative    Not on file     Social Determinants of Health     Financial Resource Strain: Not on file   Food Insecurity: Not on file   Transportation Needs: Not on file   Physical Activity: Sufficiently Active    Days of Exercise per Week: 4 days    Minutes of Exercise per Session: 50 min   Stress: Not on file   Social Connections: Not on file   Intimate Partner Violence: Not on file   Housing Stability: Not on file     Allergies:  No Known Allergies    Current Medications:  Current Outpatient Medications   Medication Sig Dispense Refill    simvastatin (ZOCOR) 80 MG tablet Take 1 tablet by mouth nightly 90 tablet 1    gabapentin (NEURONTIN) 800 MG tablet Take 1 tablet by mouth 3 times daily for 30 days. 270 tablet 3    tamsulosin (FLOMAX) 0.4 MG capsule Take 0.4 mg by mouth daily      aspirin 81 MG chewable tablet Take 1 tablet by mouth daily. 30 tablet 3    fluticasone (FLONASE) 50 MCG/ACT nasal spray 1 spray by Nasal route daily. No current facility-administered medications for this visit.      Physical Exam:  /66   Pulse 59   Resp 16   Ht 6' (1.829 m)   Wt 186 lb 9.6 oz (84.6 kg)   BMI 25.31 kg/m²   Wt Readings from Last 3 Encounters:   22 189 lb (85.7 kg)   21 185 lb 9.6 oz (84.2 kg)   21 191 lb (86.6 kg)     Appearance: Awake, alert, no acute respiratory distress  Skin: Intact, no rash  Head: Normocephalic, atraumatic  Eyes: EOMI, no conjunctival erythema  ENMT: No pharyngeal erythema, MMM, no rhinorrhea  Neck: Supple, no elevated JVP, no carotid bruits  Lungs: Clear to auscultation bilaterally. No wheezes, rales, or rhonchi. Cardiac: Regular rate and rhythm, +S1S2, no murmurs apparent  Abdomen: Soft, nontender, +bowel sounds  Extremities: Moves all extremities x 4, no lower extremity edema  Neurologic: No focal motor deficits apparent, normal mood and affect  Peripheral Pulses: Intact posterior tibial pulses bilaterally    Laboratory Tests:  Lab Results   Component Value Date    CREATININE 0.9 05/05/2022    BUN 10 05/05/2022     05/05/2022    K 4.6 05/05/2022     (H) 05/05/2022    CO2 25 05/05/2022     Lab Results   Component Value Date/Time    WBC 7.2 09/25/2019 10:07 AM    RBC 3.77 09/25/2019 10:07 AM    HGB 10.9 09/25/2019 10:07 AM    HCT 34.3 09/25/2019 10:07 AM    MCV 91.0 09/25/2019 10:07 AM    MCH 28.9 09/25/2019 10:07 AM    MCHC 31.8 09/25/2019 10:07 AM    RDW 12.2 09/25/2019 10:07 AM     09/25/2019 10:07 AM    MPV 9.2 09/25/2019 10:07 AM     Lab Results   Component Value Date/Time    MG 2.3 11/05/2013 04:30 AM     Lab Results   Component Value Date/Time    PROTIME 14.3 11/04/2013 11:25 AM    INR 1.5 11/04/2013 11:25 AM     Lab Results   Component Value Date/Time    TSH 1.115 11/03/2013 04:30 AM     Lab Results   Component Value Date    TRIG 63 05/05/2022    TRIG 96 11/30/2020    TRIG 69 06/08/2020     Lab Results   Component Value Date    HDL 71 05/05/2022    HDL 51 11/30/2020    HDL 64 06/08/2020     Lab Results   Component Value Date    LDLCALC 62 05/05/2022    LDLCALC 72 11/30/2020    [address] 06/08/2020     Cardiac Tests:  ECG: SB, rate 59, no acute STT changes    ASSESSMENT / PLAN:  1. CAD/NSTEMI s/p CABG on 11/4/13 (LIMA-LAD, SVG-D1, SVG-OM1, and SVG-PDA) -- currently with no active cardiac complaints  2. Prior TIA  3. Normal LV systolic function with no wall motion abnormalities on LV gram (EF 60%)  4. History of sinus bradycardia    - Continue ASA and statin  - Results of 5/2022 lipid panel reviewed with the patient  - Beta blocker discontinued at prior office visit due to bradycardia and dizziness (no further complaints)  - Follow-up in 1 year      Levi Summers MD  [facility]) Cardiology

## 2022-12-02 DIAGNOSIS — I25.10 CORONARY ARTERY DISEASE INVOLVING NATIVE HEART WITHOUT ANGINA PECTORIS, UNSPECIFIED VESSEL OR LESION TYPE: ICD-10-CM

## 2022-12-02 RX ORDER — SIMVASTATIN 80 MG
80 TABLET ORAL NIGHTLY
Qty: 90 TABLET | Refills: 1 | Status: SHIPPED | OUTPATIENT
Start: 2022-12-02

## 2023-02-20 DIAGNOSIS — I25.10 CORONARY ARTERY DISEASE INVOLVING NATIVE HEART WITHOUT ANGINA PECTORIS, UNSPECIFIED VESSEL OR LESION TYPE: ICD-10-CM

## 2023-02-20 RX ORDER — GABAPENTIN 800 MG/1
800 TABLET ORAL 3 TIMES DAILY
Qty: 270 TABLET | Refills: 0 | Status: SHIPPED | OUTPATIENT
Start: 2023-02-20 | End: 2023-05-21

## 2023-02-20 RX ORDER — SIMVASTATIN 80 MG
80 TABLET ORAL NIGHTLY
Qty: 90 TABLET | Refills: 0 | Status: SHIPPED | OUTPATIENT
Start: 2023-02-20

## 2023-05-09 ENCOUNTER — OFFICE VISIT (OUTPATIENT)
Dept: PRIMARY CARE CLINIC | Age: 78
End: 2023-05-09
Payer: MEDICARE

## 2023-05-09 VITALS
BODY MASS INDEX: 24.28 KG/M2 | WEIGHT: 179 LBS | HEART RATE: 58 BPM | SYSTOLIC BLOOD PRESSURE: 122 MMHG | OXYGEN SATURATION: 97 % | DIASTOLIC BLOOD PRESSURE: 76 MMHG | TEMPERATURE: 97.4 F

## 2023-05-09 DIAGNOSIS — M48.062 SPINAL STENOSIS OF LUMBAR REGION WITH NEUROGENIC CLAUDICATION: ICD-10-CM

## 2023-05-09 DIAGNOSIS — I25.10 CORONARY ARTERY DISEASE INVOLVING NATIVE HEART WITHOUT ANGINA PECTORIS, UNSPECIFIED VESSEL OR LESION TYPE: ICD-10-CM

## 2023-05-09 DIAGNOSIS — Z12.5 SCREENING PSA (PROSTATE SPECIFIC ANTIGEN): ICD-10-CM

## 2023-05-09 DIAGNOSIS — I25.119 CORONARY ARTERY DISEASE INVOLVING NATIVE HEART WITH ANGINA PECTORIS, UNSPECIFIED VESSEL OR LESION TYPE (HCC): ICD-10-CM

## 2023-05-09 DIAGNOSIS — Z00.00 MEDICARE ANNUAL WELLNESS VISIT, SUBSEQUENT: Primary | ICD-10-CM

## 2023-05-09 LAB
ALBUMIN SERPL-MCNC: 4.5 G/DL (ref 3.5–5.2)
ALP SERPL-CCNC: 68 U/L (ref 40–129)
ALT SERPL-CCNC: 23 U/L (ref 0–40)
ANION GAP SERPL CALCULATED.3IONS-SCNC: 9 MMOL/L (ref 7–16)
AST SERPL-CCNC: 25 U/L (ref 0–39)
BILIRUB SERPL-MCNC: 0.6 MG/DL (ref 0–1.2)
BUN SERPL-MCNC: 10 MG/DL (ref 6–23)
CALCIUM SERPL-MCNC: 9.4 MG/DL (ref 8.6–10.2)
CHLORIDE SERPL-SCNC: 108 MMOL/L (ref 98–107)
CHOLESTEROL, TOTAL: 137 MG/DL (ref 0–199)
CO2 SERPL-SCNC: 27 MMOL/L (ref 22–29)
CREAT SERPL-MCNC: 0.9 MG/DL (ref 0.7–1.2)
ERYTHROCYTE [DISTWIDTH] IN BLOOD BY AUTOMATED COUNT: 13.7 FL (ref 11.5–15)
GLUCOSE SERPL-MCNC: 111 MG/DL (ref 74–99)
HCT VFR BLD AUTO: 44.7 % (ref 37–54)
HDLC SERPL-MCNC: 62 MG/DL
HGB BLD-MCNC: 14.4 G/DL (ref 12.5–16.5)
LDLC SERPL CALC-MCNC: 62 MG/DL (ref 0–99)
MCH RBC QN AUTO: 30.4 PG (ref 26–35)
MCHC RBC AUTO-ENTMCNC: 32.2 % (ref 32–34.5)
MCV RBC AUTO: 94.3 FL (ref 80–99.9)
PLATELET # BLD AUTO: 147 E9/L (ref 130–450)
PMV BLD AUTO: 9.7 FL (ref 7–12)
POTASSIUM SERPL-SCNC: 5 MMOL/L (ref 3.5–5)
PROT SERPL-MCNC: 7.4 G/DL (ref 6.4–8.3)
PSA SERPL-MCNC: 3.08 NG/ML (ref 0–4)
RBC # BLD AUTO: 4.74 E12/L (ref 3.8–5.8)
SODIUM SERPL-SCNC: 144 MMOL/L (ref 132–146)
TRIGL SERPL-MCNC: 63 MG/DL (ref 0–149)
VLDLC SERPL CALC-MCNC: 13 MG/DL
WBC # BLD: 5.9 E9/L (ref 4.5–11.5)

## 2023-05-09 PROCEDURE — 3074F SYST BP LT 130 MM HG: CPT | Performed by: FAMILY MEDICINE

## 2023-05-09 PROCEDURE — G0439 PPPS, SUBSEQ VISIT: HCPCS | Performed by: FAMILY MEDICINE

## 2023-05-09 PROCEDURE — 3078F DIAST BP <80 MM HG: CPT | Performed by: FAMILY MEDICINE

## 2023-05-09 PROCEDURE — 1123F ACP DISCUSS/DSCN MKR DOCD: CPT | Performed by: FAMILY MEDICINE

## 2023-05-09 RX ORDER — GABAPENTIN 800 MG/1
800 TABLET ORAL 3 TIMES DAILY
Qty: 270 TABLET | Refills: 0 | Status: SHIPPED | OUTPATIENT
Start: 2023-05-09 | End: 2023-08-07

## 2023-05-09 RX ORDER — SIMVASTATIN 80 MG
80 TABLET ORAL NIGHTLY
Qty: 90 TABLET | Refills: 1 | Status: SHIPPED | OUTPATIENT
Start: 2023-05-09

## 2023-05-09 SDOH — ECONOMIC STABILITY: FOOD INSECURITY: WITHIN THE PAST 12 MONTHS, THE FOOD YOU BOUGHT JUST DIDN'T LAST AND YOU DIDN'T HAVE MONEY TO GET MORE.: NEVER TRUE

## 2023-05-09 SDOH — ECONOMIC STABILITY: FOOD INSECURITY: WITHIN THE PAST 12 MONTHS, YOU WORRIED THAT YOUR FOOD WOULD RUN OUT BEFORE YOU GOT MONEY TO BUY MORE.: NEVER TRUE

## 2023-05-09 SDOH — ECONOMIC STABILITY: INCOME INSECURITY: HOW HARD IS IT FOR YOU TO PAY FOR THE VERY BASICS LIKE FOOD, HOUSING, MEDICAL CARE, AND HEATING?: NOT HARD AT ALL

## 2023-05-09 SDOH — ECONOMIC STABILITY: HOUSING INSECURITY
IN THE LAST 12 MONTHS, WAS THERE A TIME WHEN YOU DID NOT HAVE A STEADY PLACE TO SLEEP OR SLEPT IN A SHELTER (INCLUDING NOW)?: NO

## 2023-05-09 ASSESSMENT — ENCOUNTER SYMPTOMS
BLOOD IN STOOL: 0
SHORTNESS OF BREATH: 0
CHEST TIGHTNESS: 0
CONSTIPATION: 0
DIARRHEA: 0

## 2023-05-09 ASSESSMENT — LIFESTYLE VARIABLES
HOW MANY STANDARD DRINKS CONTAINING ALCOHOL DO YOU HAVE ON A TYPICAL DAY: PATIENT DOES NOT DRINK
HOW OFTEN DO YOU HAVE A DRINK CONTAINING ALCOHOL: NEVER

## 2023-05-09 NOTE — PROGRESS NOTES
Medicare Annual Wellness Visit    Jose Rafael Sosa is here for Medicare AWV    Assessment & Plan   Medicare annual wellness visit, subsequent  Coronary artery disease involving native heart without angina pectoris, unspecified vessel or lesion type  -     simvastatin (ZOCOR) 80 MG tablet; Take 1 tablet by mouth nightly, Disp-90 tablet, R-1Normal  -     Lipid Panel; Future  -     CBC; Future  -     Comprehensive Metabolic Panel; Future  Screening PSA (prostate specific antigen)  -     PSA Screening; Future  Spinal stenosis of lumbar region with neurogenic claudication  -     gabapentin (NEURONTIN) 800 MG tablet; Take 1 tablet by mouth 3 times daily for 90 days. , Disp-270 tablet, R-0Normal  Coronary artery disease involving native heart with angina pectoris, unspecified vessel or lesion type [facility])    Recommendations for Preventive Services Due: see orders and patient instructions/AVS.  Recommended screening schedule for the next 5-10 years is provided to the patient in written form: see Patient Instructions/AVS.     Return for Medicare Annual Wellness Visit in 1 year, as needed, or for acute problem. Subjective   The following acute and/or chronic problems were also addressed today:    Jose Rafael Sosa, a male of 68 y.o. came to the office 5/9/2023. Patient Active Problem List   Diagnosis    HTN (hypertension), benign    NSTEMI (non-ST elevated myocardial infarction) [facility]    TIA (transient ischemic attack)    S/P CABG x 4    Chronic pain syndrome    Spinal stenosis of lumbar region with neurogenic claudication    Chronic bilateral low back pain with bilateral sciatica    Coronary artery disease involving native heart with angina pectoris [facility])          Coronary Artery Disease  Presents for follow-up visit. Pertinent negatives include no chest pain, chest pressure, chest tightness, leg swelling, muscle weakness, palpitations or shortness of breath. The symptoms have been stable. Compliance with diet is good.

## 2023-06-22 ENCOUNTER — OFFICE VISIT (OUTPATIENT)
Dept: FAMILY MEDICINE CLINIC | Age: 78
End: 2023-06-22
Payer: MEDICARE

## 2023-06-22 VITALS
SYSTOLIC BLOOD PRESSURE: 112 MMHG | HEART RATE: 66 BPM | WEIGHT: 181 LBS | BODY MASS INDEX: 25.34 KG/M2 | HEIGHT: 71 IN | RESPIRATION RATE: 20 BRPM | TEMPERATURE: 97.8 F | DIASTOLIC BLOOD PRESSURE: 68 MMHG | OXYGEN SATURATION: 97 %

## 2023-06-22 DIAGNOSIS — H61.21 IMPACTED CERUMEN OF RIGHT EAR: Primary | ICD-10-CM

## 2023-06-22 PROCEDURE — 3078F DIAST BP <80 MM HG: CPT | Performed by: PHYSICIAN ASSISTANT

## 2023-06-22 PROCEDURE — 1123F ACP DISCUSS/DSCN MKR DOCD: CPT | Performed by: PHYSICIAN ASSISTANT

## 2023-06-22 PROCEDURE — 3074F SYST BP LT 130 MM HG: CPT | Performed by: PHYSICIAN ASSISTANT

## 2023-06-22 PROCEDURE — 69210 REMOVE IMPACTED EAR WAX UNI: CPT | Performed by: PHYSICIAN ASSISTANT

## 2023-06-22 PROCEDURE — 99214 OFFICE O/P EST MOD 30 MIN: CPT | Performed by: PHYSICIAN ASSISTANT

## 2023-06-22 NOTE — PROGRESS NOTES
23  Ramón Mcnally : 1945 Sex: male  Age 68 y.o. Subjective:  Chief Complaint   Patient presents with    Ear Fullness         HPI:   Ramón Mcnally , 68 y.o. male presents to express care for evaluation of ear fullness    HPI  43-year-old male presents to express care for evaluation of bilateral ear fullness. The patient's had the symptoms ongoing for quite a while now. The patient believes that he has wax in his ear. The patient is prone to getting wax in his ears. He does have some chronic sinus issues and he always has a little bit of congestion this is not a new finding for the patient. The patient Thiry fever, chills. ROS:   Unless otherwise stated in this report the patient's positive and negative responses for review of systems for constitutional, eyes, ENT, cardiovascular, respiratory, gastrointestinal, neurological, , musculoskeletal, and integument systems and related systems to the presenting problem are either stated in the history of present illness or were not pertinent or were negative for the symptoms and/or complaints related to the presenting medical problem. Positives and pertinent negatives as per HPI. All others reviewed and are negative. PMH:     Past Medical History:   Diagnosis Date    CAD (coronary artery disease)     Dr. Luis Solares     Chronic back pain     Colon polyp     Kidney stones     Sciatica     Sinusitis     TIA (transient ischemic attack)     Unspecified cerebral artery occlusion with cerebral infarction 2013    TIA- no deficits        Past Surgical History:   Procedure Laterality Date    COLONOSCOPY  2015    [facility]    COLONOSCOPY  10/05/2021    [facility]    CORONARY ARTERY BYPASS GRAFT  11/01/2013    x 4  Dr. Adalgisa De Leon, COLON, DIAGNOSTIC      EPIDURAL STEROID INJECTION N/A 2019    L5-S1 EPIDURAL STEROID INJECTION performed by Zaki Culp DO at [facility]?

## 2023-08-17 DIAGNOSIS — M48.062 SPINAL STENOSIS OF LUMBAR REGION WITH NEUROGENIC CLAUDICATION: ICD-10-CM

## 2023-08-17 RX ORDER — GABAPENTIN 800 MG/1
TABLET ORAL
Qty: 270 TABLET | Refills: 1 | Status: SHIPPED | OUTPATIENT
Start: 2023-08-17 | End: 2024-02-13

## 2023-11-29 DIAGNOSIS — I25.10 CORONARY ARTERY DISEASE INVOLVING NATIVE HEART WITHOUT ANGINA PECTORIS, UNSPECIFIED VESSEL OR LESION TYPE: ICD-10-CM

## 2023-11-30 RX ORDER — SIMVASTATIN 80 MG
80 TABLET ORAL NIGHTLY
Qty: 90 TABLET | Refills: 1 | Status: SHIPPED | OUTPATIENT
Start: 2023-11-30

## 2024-02-09 DIAGNOSIS — M48.062 SPINAL STENOSIS OF LUMBAR REGION WITH NEUROGENIC CLAUDICATION: ICD-10-CM

## 2024-02-09 RX ORDER — GABAPENTIN 800 MG/1
800 TABLET ORAL 3 TIMES DAILY
Qty: 270 TABLET | Refills: 0 | Status: SHIPPED | OUTPATIENT
Start: 2024-02-09 | End: 2024-08-07

## 2024-05-01 ENCOUNTER — TRANSCRIBE ORDERS (OUTPATIENT)
Dept: ADMINISTRATIVE | Age: 79
End: 2024-05-01

## 2024-05-01 DIAGNOSIS — N40.1 BENIGN PROSTATIC HYPERPLASIA WITH LOWER URINARY TRACT SYMPTOMS, SYMPTOM DETAILS UNSPECIFIED: ICD-10-CM

## 2024-05-01 DIAGNOSIS — R33.8 OTHER RETENTION OF URINE: Primary | ICD-10-CM

## 2024-05-24 ENCOUNTER — OFFICE VISIT (OUTPATIENT)
Dept: PRIMARY CARE CLINIC | Age: 79
End: 2024-05-24

## 2024-05-24 VITALS
OXYGEN SATURATION: 97 % | RESPIRATION RATE: 18 BRPM | HEART RATE: 61 BPM | WEIGHT: 188 LBS | DIASTOLIC BLOOD PRESSURE: 80 MMHG | HEIGHT: 71 IN | TEMPERATURE: 97.5 F | BODY MASS INDEX: 26.32 KG/M2 | SYSTOLIC BLOOD PRESSURE: 128 MMHG

## 2024-05-24 DIAGNOSIS — I25.10 CORONARY ARTERY DISEASE INVOLVING NATIVE HEART WITHOUT ANGINA PECTORIS, UNSPECIFIED VESSEL OR LESION TYPE: ICD-10-CM

## 2024-05-24 DIAGNOSIS — M48.062 SPINAL STENOSIS OF LUMBAR REGION WITH NEUROGENIC CLAUDICATION: ICD-10-CM

## 2024-05-24 DIAGNOSIS — Z00.00 MEDICARE ANNUAL WELLNESS VISIT, SUBSEQUENT: Primary | ICD-10-CM

## 2024-05-24 DIAGNOSIS — I25.119 CORONARY ARTERY DISEASE INVOLVING NATIVE HEART WITH ANGINA PECTORIS, UNSPECIFIED VESSEL OR LESION TYPE (HCC): ICD-10-CM

## 2024-05-24 LAB
ALBUMIN: 4.6 G/DL (ref 3.5–5.2)
ALP BLD-CCNC: 70 U/L (ref 40–129)
ALT SERPL-CCNC: 20 U/L (ref 0–40)
ANION GAP SERPL CALCULATED.3IONS-SCNC: 12 MMOL/L (ref 7–16)
AST SERPL-CCNC: 24 U/L (ref 0–39)
BILIRUB SERPL-MCNC: 0.5 MG/DL (ref 0–1.2)
BUN BLDV-MCNC: 10 MG/DL (ref 6–23)
CALCIUM SERPL-MCNC: 9.4 MG/DL (ref 8.6–10.2)
CHLORIDE BLD-SCNC: 107 MMOL/L (ref 98–107)
CHOLESTEROL, TOTAL: 149 MG/DL
CO2: 24 MMOL/L (ref 22–29)
CREAT SERPL-MCNC: 0.9 MG/DL (ref 0.7–1.2)
GFR, ESTIMATED: 89 ML/MIN/1.73M2
GLUCOSE BLD-MCNC: 107 MG/DL (ref 74–99)
HCT VFR BLD CALC: 42.7 % (ref 37–54)
HDLC SERPL-MCNC: 74 MG/DL
HEMOGLOBIN: 13.7 G/DL (ref 12.5–16.5)
LDL CHOLESTEROL: 63 MG/DL
MCH RBC QN AUTO: 30.4 PG (ref 26–35)
MCHC RBC AUTO-ENTMCNC: 32.1 G/DL (ref 32–34.5)
MCV RBC AUTO: 94.7 FL (ref 80–99.9)
PDW BLD-RTO: 13.6 % (ref 11.5–15)
PLATELET # BLD: 154 K/UL (ref 130–450)
PMV BLD AUTO: 9.8 FL (ref 7–12)
POTASSIUM SERPL-SCNC: 4.9 MMOL/L (ref 3.5–5)
RBC # BLD: 4.51 M/UL (ref 3.8–5.8)
SODIUM BLD-SCNC: 143 MMOL/L (ref 132–146)
TOTAL PROTEIN: 7.4 G/DL (ref 6.4–8.3)
TRIGL SERPL-MCNC: 62 MG/DL
VLDLC SERPL CALC-MCNC: 12 MG/DL
WBC # BLD: 5.9 K/UL (ref 4.5–11.5)

## 2024-05-24 RX ORDER — GABAPENTIN 800 MG/1
800 TABLET ORAL 3 TIMES DAILY
Qty: 270 TABLET | Refills: 1 | Status: SHIPPED | OUTPATIENT
Start: 2024-05-24 | End: 2024-11-20

## 2024-05-24 RX ORDER — SIMVASTATIN 80 MG
80 TABLET ORAL NIGHTLY
Qty: 90 TABLET | Refills: 1 | Status: SHIPPED | OUTPATIENT
Start: 2024-05-24

## 2024-05-24 SDOH — ECONOMIC STABILITY: FOOD INSECURITY: WITHIN THE PAST 12 MONTHS, THE FOOD YOU BOUGHT JUST DIDN'T LAST AND YOU DIDN'T HAVE MONEY TO GET MORE.: NEVER TRUE

## 2024-05-24 SDOH — ECONOMIC STABILITY: FOOD INSECURITY: WITHIN THE PAST 12 MONTHS, YOU WORRIED THAT YOUR FOOD WOULD RUN OUT BEFORE YOU GOT MONEY TO BUY MORE.: NEVER TRUE

## 2024-05-24 SDOH — ECONOMIC STABILITY: INCOME INSECURITY: HOW HARD IS IT FOR YOU TO PAY FOR THE VERY BASICS LIKE FOOD, HOUSING, MEDICAL CARE, AND HEATING?: NOT HARD AT ALL

## 2024-05-24 ASSESSMENT — PATIENT HEALTH QUESTIONNAIRE - PHQ9
2. FEELING DOWN, DEPRESSED OR HOPELESS: NOT AT ALL
SUM OF ALL RESPONSES TO PHQ QUESTIONS 1-9: 0
1. LITTLE INTEREST OR PLEASURE IN DOING THINGS: NOT AT ALL
SUM OF ALL RESPONSES TO PHQ9 QUESTIONS 1 & 2: 0
SUM OF ALL RESPONSES TO PHQ QUESTIONS 1-9: 0

## 2024-05-24 ASSESSMENT — LIFESTYLE VARIABLES
HOW OFTEN DO YOU HAVE A DRINK CONTAINING ALCOHOL: NEVER
HOW MANY STANDARD DRINKS CONTAINING ALCOHOL DO YOU HAVE ON A TYPICAL DAY: PATIENT DOES NOT DRINK

## 2024-05-24 ASSESSMENT — ENCOUNTER SYMPTOMS
CHEST TIGHTNESS: 0
SHORTNESS OF BREATH: 0

## 2024-05-24 NOTE — PROGRESS NOTES
up appointments were made and/or referrals ordered.    No Positive Risk Factors identified today.                                  Objective   Vitals:    05/24/24 0813   BP: 128/80   Site: Left Upper Arm   Position: Sitting   Pulse: 61   Resp: 18   Temp: 97.5 °F (36.4 °C)   SpO2: 97%   Weight: 85.3 kg (188 lb)   Height: 1.803 m (5' 10.98\")      Body mass index is 26.23 kg/m².      General Appearance: alert and oriented to person, place and time, well developed and well- nourished, in no acute distress  Skin: warm and dry, no rash or erythema  Head: normocephalic and atraumatic  Eyes: pupils equal, round, and reactive to light, extraocular eye movements intact, conjunctivae normal  ENT: tympanic membrane, external ear and ear canal normal bilaterally, nose without deformity, nasal mucosa and turbinates normal without polyps  Neck: supple and non-tender without mass, no thyromegaly or thyroid nodules, no cervical lymphadenopathy  Pulmonary/Chest: clear to auscultation bilaterally- no wheezes, rales or rhonchi, normal air movement, no respiratory distress  Cardiovascular: normal rate, regular rhythm, normal S1 and S2, no murmurs, rubs, clicks, or gallops, distal pulses intact, no carotid bruits  Abdomen: soft, non-tender, non-distended, normal bowel sounds, no masses or organomegaly  Extremities: no cyanosis, clubbing or edema  Musculoskeletal: normal range of motion, no joint swelling, deformity or tenderness  Neurologic: reflexes normal and symmetric, no cranial nerve deficit, gait, coordination and speech normal       No Known Allergies  Prior to Visit Medications    Medication Sig Taking? Authorizing Provider   gabapentin (NEURONTIN) 800 MG tablet Take 1 tablet by mouth 3 times daily for 180 days. Yes Kenny Tyler, DO   simvastatin (ZOCOR) 80 MG tablet Take 1 tablet by mouth nightly Yes Kenny Tyler, DO   tamsulosin (FLOMAX) 0.4 MG capsule Take 1 capsule by mouth daily Yes Provider,

## 2024-05-24 NOTE — PATIENT INSTRUCTIONS
every 1-2 years to screen for glaucoma; cataracts, macular degeneration, and other eye disorders.  A preventive dental visit is recommended every 6 months.  Try to get at least 150 minutes of exercise per week or 10,000 steps per day on a pedometer .  Order or download the FREE \"Exercise & Physical Activity: Your Everyday Guide\" from [employer]. Call 1-410.277.1470 or search [employer] online.  You need 3774-8783 mg of calcium and 5485-0173 IU of vitamin D per day. It is possible to meet your calcium requirement with diet alone, but a vitamin D supplement is usually necessary to meet this goal.  When exposed to the sun, use a sunscreen that protects against both UVA and UVB radiation with an SPF of 30 or greater. Reapply every 2 to 3 hours or after sweating, drying off with a towel, or swimming.  Always wear a seat belt when traveling in a car. Always wear a helmet when riding a bicycle or motorcycle.

## 2024-05-28 DIAGNOSIS — I25.10 CORONARY ARTERY DISEASE INVOLVING NATIVE HEART WITHOUT ANGINA PECTORIS, UNSPECIFIED VESSEL OR LESION TYPE: ICD-10-CM

## 2024-05-28 RX ORDER — SIMVASTATIN 80 MG
80 TABLET ORAL NIGHTLY
Qty: 90 TABLET | Refills: 0 | OUTPATIENT
Start: 2024-05-28

## 2024-05-29 ENCOUNTER — TELEPHONE (OUTPATIENT)
Dept: PRIMARY CARE CLINIC | Age: 79
End: 2024-05-29

## 2024-05-29 NOTE — TELEPHONE ENCOUNTER
Called patient discussed labs.  LDL at 62 goal is to be under 55.  Recommend adding Zetia to his Zocor 80 mg.  He refuses at this time.  All other labs stable

## 2024-06-19 ENCOUNTER — HOSPITAL ENCOUNTER (OUTPATIENT)
Dept: ULTRASOUND IMAGING | Age: 79
Discharge: HOME | End: 2024-06-21
Attending: UROLOGY
Payer: MEDICARE

## 2024-06-19 DIAGNOSIS — R33.8 OTHER RETENTION OF URINE: ICD-10-CM

## 2024-06-19 DIAGNOSIS — N40.1 BENIGN PROSTATIC HYPERPLASIA WITH LOWER URINARY TRACT SYMPTOMS, SYMPTOM DETAILS UNSPECIFIED: ICD-10-CM

## 2024-06-19 PROCEDURE — 76770 US EXAM ABDO BACK WALL COMP: CPT

## 2024-06-19 PROCEDURE — 76775 US EXAM ABDO BACK WALL LIM: CPT | Performed by: UROLOGY

## 2024-09-18 ENCOUNTER — OFFICE VISIT (OUTPATIENT)
Dept: FAMILY MEDICINE CLINIC | Age: 79
End: 2024-09-18

## 2024-09-18 VITALS
WEIGHT: 185 LBS | HEART RATE: 60 BPM | DIASTOLIC BLOOD PRESSURE: 84 MMHG | SYSTOLIC BLOOD PRESSURE: 116 MMHG | OXYGEN SATURATION: 96 % | BODY MASS INDEX: 25.81 KG/M2 | TEMPERATURE: 98 F

## 2024-09-18 DIAGNOSIS — H61.23 BILATERAL IMPACTED CERUMEN: Primary | ICD-10-CM

## 2024-09-19 ENCOUNTER — TELEPHONE (OUTPATIENT)
Dept: CARDIOLOGY CLINIC | Age: 79
End: 2024-09-19

## 2024-09-19 ENCOUNTER — TELEPHONE (OUTPATIENT)
Dept: PRIMARY CARE CLINIC | Age: 79
End: 2024-09-19

## 2024-09-19 NOTE — TELEPHONE ENCOUNTER
Patient states that he has been having pain in his right shoulder that radiates to his back, no sob or chest pain, he is informing his pcp as well, please advise

## 2024-09-20 ENCOUNTER — OFFICE VISIT (OUTPATIENT)
Dept: FAMILY MEDICINE CLINIC | Age: 79
End: 2024-09-20

## 2024-09-20 VITALS
HEIGHT: 71 IN | DIASTOLIC BLOOD PRESSURE: 68 MMHG | OXYGEN SATURATION: 98 % | BODY MASS INDEX: 26.01 KG/M2 | HEART RATE: 60 BPM | TEMPERATURE: 98.1 F | SYSTOLIC BLOOD PRESSURE: 122 MMHG | WEIGHT: 185.8 LBS

## 2024-09-20 DIAGNOSIS — M54.6 DORSALGIA OF THORACIC REGION: Primary | ICD-10-CM

## 2024-11-18 DIAGNOSIS — M48.062 SPINAL STENOSIS OF LUMBAR REGION WITH NEUROGENIC CLAUDICATION: ICD-10-CM

## 2024-11-18 DIAGNOSIS — I25.10 CORONARY ARTERY DISEASE INVOLVING NATIVE HEART WITHOUT ANGINA PECTORIS, UNSPECIFIED VESSEL OR LESION TYPE: ICD-10-CM

## 2024-11-18 RX ORDER — SIMVASTATIN 80 MG
80 TABLET ORAL NIGHTLY
Qty: 90 TABLET | Refills: 1 | Status: SHIPPED | OUTPATIENT
Start: 2024-11-18

## 2024-11-18 RX ORDER — GABAPENTIN 800 MG/1
800 TABLET ORAL 3 TIMES DAILY
Qty: 270 TABLET | Refills: 1 | Status: SHIPPED | OUTPATIENT
Start: 2024-11-18 | End: 2025-05-17

## 2024-11-18 NOTE — TELEPHONE ENCOUNTER
Last Appointment:  5/24/2024  Pt heading to [place] will make appt in April when he returns home

## 2024-11-27 ENCOUNTER — HOSPITAL ENCOUNTER (OUTPATIENT)
Age: 79
Discharge: HOME | End: 2024-11-29

## 2024-12-05 LAB — SURGICAL PATHOLOGY REPORT: NORMAL

## 2025-05-13 ENCOUNTER — OFFICE VISIT (OUTPATIENT)
Dept: PRIMARY CARE CLINIC | Age: 80
End: 2025-05-13

## 2025-05-13 VITALS
HEART RATE: 60 BPM | RESPIRATION RATE: 16 BRPM | SYSTOLIC BLOOD PRESSURE: 128 MMHG | TEMPERATURE: 97.3 F | WEIGHT: 179.5 LBS | DIASTOLIC BLOOD PRESSURE: 68 MMHG | HEIGHT: 71 IN | BODY MASS INDEX: 25.13 KG/M2 | OXYGEN SATURATION: 98 %

## 2025-05-13 DIAGNOSIS — Z12.5 SCREENING PSA (PROSTATE SPECIFIC ANTIGEN): ICD-10-CM

## 2025-05-13 DIAGNOSIS — Z00.00 MEDICARE ANNUAL WELLNESS VISIT, SUBSEQUENT: Primary | ICD-10-CM

## 2025-05-13 DIAGNOSIS — M48.062 SPINAL STENOSIS OF LUMBAR REGION WITH NEUROGENIC CLAUDICATION: ICD-10-CM

## 2025-05-13 DIAGNOSIS — I25.10 CORONARY ARTERY DISEASE INVOLVING NATIVE HEART WITHOUT ANGINA PECTORIS, UNSPECIFIED VESSEL OR LESION TYPE: ICD-10-CM

## 2025-05-13 LAB
ALBUMIN: 4.5 G/DL (ref 3.5–5.2)
ALP BLD-CCNC: 71 U/L (ref 40–129)
ALT SERPL-CCNC: 16 U/L (ref 0–50)
ANION GAP SERPL CALCULATED.3IONS-SCNC: 10 MMOL/L (ref 7–16)
AST SERPL-CCNC: 24 U/L (ref 0–50)
BILIRUB SERPL-MCNC: 0.6 MG/DL (ref 0–1.2)
BUN BLDV-MCNC: 7 MG/DL (ref 8–23)
CALCIUM SERPL-MCNC: 9.4 MG/DL (ref 8.8–10.2)
CHLORIDE BLD-SCNC: 106 MMOL/L (ref 98–107)
CHOLESTEROL, TOTAL: 126 MG/DL
CO2: 25 MMOL/L (ref 22–29)
CREAT SERPL-MCNC: 0.9 MG/DL (ref 0.7–1.2)
GFR, ESTIMATED: 86 ML/MIN/1.73M2
GLUCOSE BLD-MCNC: 91 MG/DL (ref 74–99)
HCT VFR BLD CALC: 36.9 % (ref 37–54)
HDLC SERPL-MCNC: 63 MG/DL
HEMOGLOBIN: 11.5 G/DL (ref 12.5–16.5)
LDL CHOLESTEROL: 51 MG/DL
MCH RBC QN AUTO: 25.8 PG (ref 26–35)
MCHC RBC AUTO-ENTMCNC: 31.2 G/DL (ref 32–34.5)
MCV RBC AUTO: 82.9 FL (ref 80–99.9)
PDW BLD-RTO: 16.5 % (ref 11.5–15)
PLATELET # BLD: 157 K/UL (ref 130–450)
PMV BLD AUTO: 9.6 FL (ref 7–12)
POTASSIUM SERPL-SCNC: 4.3 MMOL/L (ref 3.5–5.1)
PROSTATE SPECIFIC ANTIGEN: 4.59 NG/ML (ref 0–4)
RBC # BLD: 4.45 M/UL (ref 3.8–5.8)
SODIUM BLD-SCNC: 141 MMOL/L (ref 136–145)
TOTAL PROTEIN: 7.5 G/DL (ref 6.4–8.3)
TRIGL SERPL-MCNC: 59 MG/DL
VLDLC SERPL CALC-MCNC: 12 MG/DL
WBC # BLD: 7.4 K/UL (ref 4.5–11.5)

## 2025-05-13 SDOH — ECONOMIC STABILITY: FOOD INSECURITY: WITHIN THE PAST 12 MONTHS, THE FOOD YOU BOUGHT JUST DIDN'T LAST AND YOU DIDN'T HAVE MONEY TO GET MORE.: NEVER TRUE

## 2025-05-13 SDOH — ECONOMIC STABILITY: FOOD INSECURITY: WITHIN THE PAST 12 MONTHS, YOU WORRIED THAT YOUR FOOD WOULD RUN OUT BEFORE YOU GOT MONEY TO BUY MORE.: NEVER TRUE

## 2025-05-13 ASSESSMENT — PATIENT HEALTH QUESTIONNAIRE - PHQ9
1. LITTLE INTEREST OR PLEASURE IN DOING THINGS: NOT AT ALL
SUM OF ALL RESPONSES TO PHQ QUESTIONS 1-9: 0
2. FEELING DOWN, DEPRESSED OR HOPELESS: NOT AT ALL

## 2025-05-13 ASSESSMENT — ENCOUNTER SYMPTOMS
BACK PAIN: 1
SHORTNESS OF BREATH: 0

## 2025-05-13 NOTE — PROGRESS NOTES
Medicare Annual Wellness Visit    Rogelio Christopher is here for Medicare AWV    Assessment & Plan   Medicare annual wellness visit, subsequent  Coronary artery disease involving native heart without angina pectoris, unspecified vessel or lesion type  -     Lipid Panel  -     Comprehensive Metabolic Panel  -     CBC  Screening PSA (prostate specific antigen)  -     PSA Screening  Spinal stenosis of lumbar region with neurogenic claudication       Return in 1 year (on 5/13/2026) for Medicare Annual Wellness Visit in 1 year, or for acute problem.     Subjective   The following acute and/or chronic problems were also addressed today:  Rogelio Christopher, a male of 79 y.o. came to the office 5/13/2025.     Patient Active Problem List   Diagnosis    HTN (hypertension), benign    NSTEMI (non-ST elevated myocardial infarction) (HCC)    TIA (transient ischemic attack)    S/P CABG x 4    Chronic pain syndrome    Spinal stenosis of lumbar region with neurogenic claudication    Chronic bilateral low back pain with bilateral sciatica    Coronary artery disease involving native heart with angina pectoris          Coronary Artery Disease  Presents for follow-up visit. Pertinent negatives include no chest pain, chest pressure, shortness of breath or weight gain. The symptoms have been stable. Compliance with diet is good. Compliance with exercise is good (walks daily).   Back Pain  This is a chronic problem. The current episode started more than 1 year ago. The problem has been waxing and waning since onset. The pain is present in the lumbar spine. The quality of the pain is described as aching. The pain does not radiate. Pertinent negatives include no chest pain, numbness, paresthesias or tingling. Treatments tried: gabapentin. The treatment provided no relief (so weaning off gabapentin 800 tid to 400 mg tid now.).        No Known Allergies    Current Outpatient Medications on File Prior to Visit   Medication Sig Dispense Refill

## 2025-05-13 NOTE — PATIENT INSTRUCTIONS
after a stroke, head injury, or other problem that could reduce blood flow to the brain.  Refraction and color tests  A refraction test is done to find the right prescription for glasses and contact lenses.  A color vision test is done to check for color blindness.  Color vision is often tested as part of a routine exam. You may also have this test when you apply for a job where recognizing different colors is important, such as , electronics, or the .  How are vision tests done?  Visual acuity test   You cover one eye at a time.  You read aloud from a wall chart across the room.  You read aloud from a small card that you hold in your hand.  Refraction   You look into a special device.  The device puts lenses of different strengths in front of each eye to see how strong your glasses or contact lenses need to be.  Visual field tests   Your doctor may have you look through special machines.  Or your doctor may simply have you stare straight ahead while they move a finger into and out of your field of vision.  Color vision test   You look at pieces of printed test patterns in various colors. You say what number or symbol you see.  Your doctor may have you trace the number or symbol using a pointer.  How do these tests feel?  There is very little chance of having a problem from this test. If dilating drops are used for a vision test, they may make the eyes sting and cause a medicine taste in the mouth.  Follow-up care is a key part of your treatment and safety. Be sure to make and go to all appointments, and call your doctor if you are having problems. It's also a good idea to know your test results and keep a list of the medicines you take.  Where can you learn more?  Go to https://[email or web address].com.net/patientEd and enter G551 to learn more about \"Learning About Vision Tests.\"  Current as of: July 31, 2024  Content Version: 14.4  © 8637-3009 [facility] [employer], [facility].   Care instructions adapted under

## 2025-05-15 ENCOUNTER — RESULTS FOLLOW-UP (OUTPATIENT)
Dept: PRIMARY CARE CLINIC | Age: 80
End: 2025-05-15

## 2025-05-15 NOTE — TELEPHONE ENCOUNTER
Spoke with patient's his PSA is elevated.  He has an appointment with his urologist in 1 week.    Labs essentially normal.  Hemoglobin slightly low.  He denies melena or hematochezia.  We will continue to monitor this in the future.

## 2025-05-23 ENCOUNTER — TELEPHONE (OUTPATIENT)
Dept: PRIMARY CARE CLINIC | Age: 80
End: 2025-05-23

## 2025-05-23 DIAGNOSIS — I25.10 CORONARY ARTERY DISEASE INVOLVING NATIVE HEART WITHOUT ANGINA PECTORIS, UNSPECIFIED VESSEL OR LESION TYPE: ICD-10-CM

## 2025-05-23 RX ORDER — SIMVASTATIN 80 MG
80 TABLET ORAL NIGHTLY
Qty: 90 TABLET | Refills: 3 | Status: SHIPPED | OUTPATIENT
Start: 2025-05-23

## 2025-08-07 ENCOUNTER — TELEPHONE (OUTPATIENT)
Dept: PRIMARY CARE CLINIC | Age: 80
End: 2025-08-07

## 2025-08-14 ENCOUNTER — OFFICE VISIT (OUTPATIENT)
Dept: PRIMARY CARE CLINIC | Age: 80
End: 2025-08-14

## 2025-08-14 VITALS
WEIGHT: 171 LBS | HEART RATE: 55 BPM | DIASTOLIC BLOOD PRESSURE: 66 MMHG | TEMPERATURE: 97.3 F | OXYGEN SATURATION: 99 % | HEIGHT: 71 IN | SYSTOLIC BLOOD PRESSURE: 121 MMHG | BODY MASS INDEX: 23.94 KG/M2

## 2025-08-14 DIAGNOSIS — R42 DIZZINESS ON STANDING: Primary | ICD-10-CM

## 2025-08-14 DIAGNOSIS — E55.9 VITAMIN D DEFICIENCY: ICD-10-CM

## 2025-08-14 DIAGNOSIS — D50.9 IRON DEFICIENCY ANEMIA, UNSPECIFIED IRON DEFICIENCY ANEMIA TYPE: ICD-10-CM

## 2025-08-14 ASSESSMENT — ENCOUNTER SYMPTOMS
SHORTNESS OF BREATH: 0
VISUAL CHANGE: 0
VOMITING: 0
NAUSEA: 0
ABDOMINAL PAIN: 0
HEMATEMESIS: 0
HEMATOCHEZIA: 0
DIARRHEA: 0

## 2025-08-19 DIAGNOSIS — E55.9 VITAMIN D DEFICIENCY: ICD-10-CM

## 2025-08-19 DIAGNOSIS — D50.9 IRON DEFICIENCY ANEMIA, UNSPECIFIED IRON DEFICIENCY ANEMIA TYPE: ICD-10-CM

## 2025-08-19 LAB
FERRITIN: 16 NG/ML
HCT VFR BLD CALC: 38.4 % (ref 37–54)
HEMOGLOBIN: 12.1 G/DL (ref 12.5–16.5)
IRON % SATURATION: 15 % (ref 20–55)
IRON: 56 UG/DL (ref 61–157)
MCH RBC QN AUTO: 27.2 PG (ref 26–35)
MCHC RBC AUTO-ENTMCNC: 31.5 G/DL (ref 32–34.5)
MCV RBC AUTO: 86.3 FL (ref 80–99.9)
PDW BLD-RTO: 15.6 % (ref 11.5–15)
PLATELET # BLD: 182 K/UL (ref 130–450)
PMV BLD AUTO: 9.8 FL (ref 7–12)
RBC # BLD: 4.45 M/UL (ref 3.8–5.8)
TOTAL IRON BINDING CAPACITY: 375 UG/DL (ref 250–450)
TRANSFERRIN: 304 MG/DL (ref 200–360)
VITAMIN D 25-HYDROXY: 31.7 NG/ML (ref 30–100)
WBC # BLD: 6.3 K/UL (ref 4.5–11.5)

## 2025-08-24 DIAGNOSIS — M48.062 SPINAL STENOSIS OF LUMBAR REGION WITH NEUROGENIC CLAUDICATION: ICD-10-CM

## 2025-08-24 RX ORDER — GABAPENTIN 600 MG/1
600 TABLET ORAL 3 TIMES DAILY
Qty: 270 TABLET | Refills: 1 | Status: SHIPPED | OUTPATIENT
Start: 2025-08-24 | End: 2026-02-20

## (undated) DEVICE — 3M™ RED DOT™ MONITORING ELECTRODE WITH FOAM TAPE AND STICKY GEL 2560, 50/BAG, 20/CASE, 72/PLT: Brand: RED DOT™

## (undated) DEVICE — NEEDLE HYPO 18GA L1.5IN PNK POLYPR HUB S STL THN WALL FILL

## (undated) DEVICE — NEEDLE HYPO 25GA L1.5IN BLU POLYPR HUB S STL REG BVL STR

## (undated) DEVICE — BANDAGE ADH W1XL3IN NAT FAB WVN FLX DURABLE N ADH PD SEAL

## (undated) DEVICE — 12 ML SYRINGE,LUER-LOCK TIP: Brand: MONOJECT

## (undated) DEVICE — 6 ML SYRINGE LUER-LOCK TIP: Brand: MONOJECT

## (undated) DEVICE — Z DISCONTINUED APPLICATOR SURG PREP 0.35OZ 2% CHG 70% ISO ALC W/ HI LT